# Patient Record
Sex: FEMALE | Race: WHITE | NOT HISPANIC OR LATINO | Employment: OTHER | URBAN - METROPOLITAN AREA
[De-identification: names, ages, dates, MRNs, and addresses within clinical notes are randomized per-mention and may not be internally consistent; named-entity substitution may affect disease eponyms.]

---

## 2017-01-05 ENCOUNTER — TELEPHONE (OUTPATIENT)
Dept: ORTHOPEDICS | Facility: CLINIC | Age: 82
End: 2017-01-05

## 2017-01-05 NOTE — TELEPHONE ENCOUNTER
----- Message from Radha Asa sent at 1/5/2017  8:38 AM CST -----  Contact: pt  _  1st Request  _  2nd Request  _  3rd Request        Who: pt    Why: Np - pt is requesting to see dr interiano today for shoulder pain. Nothing available until 2/14/16. Wants a nurse to call her to see if she can get in today.    What Number to Call Back:[phone]    When to Expect a call back: (Before the end of the day)   -- if call after 3:00 call back will be tomorrow.

## 2017-01-06 ENCOUNTER — OFFICE VISIT (OUTPATIENT)
Dept: ORTHOPEDICS | Facility: CLINIC | Age: 82
End: 2017-01-06
Payer: MEDICARE

## 2017-01-06 VITALS
HEIGHT: 69 IN | SYSTOLIC BLOOD PRESSURE: 109 MMHG | WEIGHT: 156.31 LBS | RESPIRATION RATE: 16 BRPM | BODY MASS INDEX: 23.15 KG/M2 | HEART RATE: 70 BPM | DIASTOLIC BLOOD PRESSURE: 60 MMHG

## 2017-01-06 DIAGNOSIS — M19.011 PRIMARY OSTEOARTHRITIS OF RIGHT SHOULDER: Primary | ICD-10-CM

## 2017-01-06 PROCEDURE — 99213 OFFICE O/P EST LOW 20 MIN: CPT | Mod: 25,S$PBB,, | Performed by: PHYSICIAN ASSISTANT

## 2017-01-06 PROCEDURE — 99999 PR PBB SHADOW E&M-EST. PATIENT-LVL IV: CPT | Mod: PBBFAC,,, | Performed by: PHYSICIAN ASSISTANT

## 2017-01-06 PROCEDURE — 20610 DRAIN/INJ JOINT/BURSA W/O US: CPT | Mod: PBBFAC | Performed by: PHYSICIAN ASSISTANT

## 2017-01-06 PROCEDURE — 20610 DRAIN/INJ JOINT/BURSA W/O US: CPT | Mod: S$PBB,RT,, | Performed by: PHYSICIAN ASSISTANT

## 2017-01-06 PROCEDURE — 99214 OFFICE O/P EST MOD 30 MIN: CPT | Mod: PBBFAC | Performed by: PHYSICIAN ASSISTANT

## 2017-01-06 RX ORDER — TRIAMCINOLONE ACETONIDE 40 MG/ML
40 INJECTION, SUSPENSION INTRA-ARTICULAR; INTRAMUSCULAR
Status: COMPLETED | OUTPATIENT
Start: 2017-01-06 | End: 2017-01-06

## 2017-01-06 RX ADMIN — TRIAMCINOLONE ACETONIDE 40 MG: 40 INJECTION, SUSPENSION INTRA-ARTICULAR; INTRAMUSCULAR at 07:01

## 2017-01-06 NOTE — PROGRESS NOTES
CC: Follow-up (right shoulder)      HPI: Pt with right shoulder pain for the past month. She was seen in the past by stephan Nuñez NP and has known djd of the right shoulder.  The pain was aching and global. It hurts more when she raises her arm over her head or lifts things.  She has taken tylenol without relief.  She is not interested in surgical intervention. She would like to try a cortisone injection since this has helped in the past.     ROS  General: denies fever and chills  Resp: no c/o sob  CVS: no c/o cp  MSK: c/o right shoulder pain which is aching and global     PE  General: AAOx3, pleasant and cooperative  Resp: respirations even and unlabored  MSK: right shoulder exam  AROM  + pain with raising the arm and with reaching  - Neer  + mora  + speeds  + cross body    Xray:  Reviewed by me: Significant degenerative change seen at the AC joint area.  Glenohumeral articulation shows early DJD    Assessment:  Right shoulder djd  Bursitis/ tendotitis    Plan:  Cortisone injection right shoulder today  Tylenol prn  F/u as needed    Shoulder Injection Procedure Note    Pre-operative Diagnosis: right shoulder pain    Post-operative Diagnosis: same    Indications: right shoulder pain    Anesthesia: none    Procedure Details     Verbal consent was obtained for the procedure. The injection site was identified and the skin was prepared with alcohol. The right shoulder was injected from a posterior approach with 1 ml of Kenalog and 5 ml Lidocaine under sterile technique using a 22 gauge 1 1/2 inch needle. The needle was removed and the area cleansed and dressed.    Complications:  None; patient tolerated the procedure well.    she was advised to rest the shoulder today, using ice as needed for comfort and swelling. she did receive immediate relief of the shoulder pain. she was told this would be short lived and is secondary to the lidocaine. she may have an increase in discomfort tonight followed by steady  improvement over the next several days. It may take 1-3 weeks following the injection to get the full benefit of the medication.

## 2017-03-15 ENCOUNTER — TELEPHONE (OUTPATIENT)
Dept: FAMILY MEDICINE | Facility: CLINIC | Age: 82
End: 2017-03-15

## 2017-03-15 NOTE — TELEPHONE ENCOUNTER
----- Message from Carline Herrera sent at 3/15/2017 10:50 AM CDT -----  Contact: self  Pt calling to check status on fax sent over by Orlando dewitt.  Please call pt at .    Thanks

## 2017-03-16 DIAGNOSIS — E11.9 TYPE 2 DIABETES MELLITUS WITHOUT COMPLICATION: ICD-10-CM

## 2017-03-22 ENCOUNTER — HOSPITAL ENCOUNTER (OUTPATIENT)
Dept: RADIOLOGY | Facility: HOSPITAL | Age: 82
Discharge: HOME | End: 2017-03-22
Attending: ORTHOPAEDIC SURGERY
Payer: MEDICARE

## 2017-03-22 ENCOUNTER — OFFICE VISIT (OUTPATIENT)
Dept: ORTHOPEDICS | Facility: CLINIC | Age: 82
End: 2017-03-22
Payer: MEDICARE

## 2017-03-22 VITALS
DIASTOLIC BLOOD PRESSURE: 52 MMHG | RESPIRATION RATE: 16 BRPM | WEIGHT: 156.31 LBS | HEIGHT: 69 IN | HEART RATE: 65 BPM | SYSTOLIC BLOOD PRESSURE: 96 MMHG | BODY MASS INDEX: 23.15 KG/M2

## 2017-03-22 DIAGNOSIS — M25.512 ACUTE PAIN OF BOTH SHOULDERS: ICD-10-CM

## 2017-03-22 DIAGNOSIS — M79.642 LEFT HAND PAIN: ICD-10-CM

## 2017-03-22 DIAGNOSIS — M25.512 ACUTE PAIN OF BOTH SHOULDERS: Primary | ICD-10-CM

## 2017-03-22 DIAGNOSIS — M25.511 ACUTE PAIN OF BOTH SHOULDERS: ICD-10-CM

## 2017-03-22 DIAGNOSIS — M25.511 ACUTE PAIN OF BOTH SHOULDERS: Primary | ICD-10-CM

## 2017-03-22 PROCEDURE — 76604 US EXAM CHEST: CPT | Mod: 26,,, | Performed by: RADIOLOGY

## 2017-03-22 PROCEDURE — 73130 X-RAY EXAM OF HAND: CPT | Mod: 26,LT,, | Performed by: RADIOLOGY

## 2017-03-22 PROCEDURE — 73030 X-RAY EXAM OF SHOULDER: CPT | Mod: 26,50,, | Performed by: RADIOLOGY

## 2017-03-22 PROCEDURE — 76604 US EXAM CHEST: CPT | Mod: TC

## 2017-03-22 PROCEDURE — 73130 X-RAY EXAM OF HAND: CPT | Mod: TC,LT

## 2017-03-22 PROCEDURE — 73030 X-RAY EXAM OF SHOULDER: CPT | Mod: TC,50

## 2017-03-22 PROCEDURE — 99999 PR PBB SHADOW E&M-EST. PATIENT-LVL V: CPT | Mod: PBBFAC,,, | Performed by: PHYSICIAN ASSISTANT

## 2017-03-22 PROCEDURE — 99214 OFFICE O/P EST MOD 30 MIN: CPT | Mod: S$PBB,,, | Performed by: PHYSICIAN ASSISTANT

## 2017-03-22 PROCEDURE — 99215 OFFICE O/P EST HI 40 MIN: CPT | Mod: PBBFAC | Performed by: PHYSICIAN ASSISTANT

## 2017-03-23 ENCOUNTER — TELEPHONE (OUTPATIENT)
Dept: ORTHOPEDICS | Facility: CLINIC | Age: 82
End: 2017-03-23

## 2017-03-23 ENCOUNTER — DOCUMENTATION ONLY (OUTPATIENT)
Dept: ORTHOPEDICS | Facility: CLINIC | Age: 82
End: 2017-03-23

## 2017-03-23 RX ORDER — CLINDAMYCIN HYDROCHLORIDE 300 MG/1
300 CAPSULE ORAL 3 TIMES DAILY
Qty: 30 CAPSULE | Refills: 0 | Status: SHIPPED | OUTPATIENT
Start: 2017-03-23 | End: 2017-03-30

## 2017-03-23 NOTE — PROGRESS NOTES
Rachelle Paredes PA-C inform pt of an appt with orthopedic w/AJ 04/06/17 at 11:15am and General Surgery w/ Dr. Goldberg 04/06/17 at 10:45 am/mailed.

## 2017-03-24 NOTE — PROGRESS NOTES
Called patient to review test results and treatment plan, Left message for her to return my call.

## 2017-03-27 NOTE — PROGRESS NOTES
Subjective:      Patient ID: Lexy Conteh is a 81 y.o. female.    Chief Complaint: Pain of the Right Shoulder; Pain of the Left Shoulder; and Pain of the Left Hand    HPI    Patient is a 81 year old female who presents to clinic with chief complaint of left thumb pain and knot on left back.   Patient stated that she has had a-traumatic constant left base thumb pain and mild swelling x 2 days. Symptoms are increased with range of motion. Patient denied fever or chills, reports history of gout and arthritis. Patient is currently taking Celebrex. \  Patient also stated that she noticed a knot on her left upper back between and scapula and her spine. Patient has history of breast cancer. She cannot do an MRI due to pace maker, she cannot do a CT with contrast secondary to kidney.     Review of Systems   Constitution: Negative for chills and fever.   Cardiovascular: Negative for chest pain.   Respiratory: Negative for cough and shortness of breath.    Skin: Negative for color change, dry skin, itching, nail changes, poor wound healing and rash.   Musculoskeletal: Positive for gout, joint pain and joint swelling.        Left thumb and left back mass.    Neurological: Negative for dizziness.   Psychiatric/Behavioral: Negative for altered mental status. The patient is not nervous/anxious.    All other systems reviewed and are negative.        Objective:        General    Constitutional: She is oriented to person, place, and time. She appears well-developed and well-nourished. No distress.   HENT:   Head: Atraumatic.   Eyes: Conjunctivae are normal.   Cardiovascular: Normal rate.    Pulmonary/Chest: Effort normal.   Neurological: She is alert and oriented to person, place, and time.   Psychiatric: She has a normal mood and affect. Her behavior is normal.         Left Hand/Wrist Exam     Inspection   Scars: Wrist - absent Hand -  absent  Effusion: Wrist - absent Hand -  absent  Bruising: Wrist - absent Hand -   absent    Pain   Hand - The patient exhibits pain of the thumb MCP.    Range of Motion     Wrist   Extension: normal   Flexion: normal   Pronation: normal   Supination: normal     Comments:  Patient has swelling of CMC with erythema and increased warmth, TTP, not to light touch.           Left Shoulder Exam     Comments:  Patient has smooth well circumcised tender movable mass to mid left upper back between scapula and spine.       Muscle Strength   Left Upper Extremity  Wrist Extension: 5/5/5   Wrist Flexion: 5/5/5   :  4/5/5   Index Finger: 5/5  Middle Finger: 5/5  Ring Finger: 5/5  Little Finger: 5/5  Thumb - APB: 4/5  Thumb - FPL: 4/5  Pinch Mechanism: 4/5      RADS:   HAND:Significant DJD involving the PIP and DIP joints.  Significant DJD are also noted involving the first carpometacarpal articulation.  Narrowed second and third metacarpophalangeal articulation No fracture or bone destruction.  Calcification noted in the area of the triangular fibrocartilage.    SHOULDER: Moderate bilateral a.c. joint degenerative changes, right greater than left., mass not visible.         Assessment:       Encounter Diagnoses   Name Primary?    Acute pain of both shoulders Yes    Left hand pain     Mass           Plan:       Discussed treatment plan of patient.   HAND: Labs placed for CMP, SED and uric acid, treatment is pending results. R/O infection vrs gout vrs arthritis. Pending results will treated with clindamycin vrs steroid.    Patient placed into brace to help rest area. She will also elevate and ice    MASS: Order placed for ultrasound, pending results patient is to get appointment with general surgery for evaluation.

## 2017-03-30 ENCOUNTER — OFFICE VISIT (OUTPATIENT)
Dept: SURGERY | Facility: CLINIC | Age: 82
End: 2017-03-30
Payer: MEDICARE

## 2017-03-30 ENCOUNTER — OFFICE VISIT (OUTPATIENT)
Dept: ORTHOPEDICS | Facility: CLINIC | Age: 82
End: 2017-03-30
Payer: MEDICARE

## 2017-03-30 VITALS
WEIGHT: 156 LBS | TEMPERATURE: 98 F | HEART RATE: 73 BPM | SYSTOLIC BLOOD PRESSURE: 129 MMHG | BODY MASS INDEX: 23.11 KG/M2 | HEIGHT: 69 IN | DIASTOLIC BLOOD PRESSURE: 60 MMHG

## 2017-03-30 DIAGNOSIS — R22.2 MASS ON BACK: Primary | ICD-10-CM

## 2017-03-30 DIAGNOSIS — M19.011 PRIMARY OSTEOARTHRITIS OF RIGHT SHOULDER: ICD-10-CM

## 2017-03-30 DIAGNOSIS — Z09 FOLLOW-UP EXAM: Primary | ICD-10-CM

## 2017-03-30 DIAGNOSIS — E11.9 TYPE 2 DIABETES MELLITUS WITHOUT COMPLICATION: ICD-10-CM

## 2017-03-30 PROCEDURE — 99999 PR PBB SHADOW E&M-EST. PATIENT-LVL III: CPT | Mod: PBBFAC,,, | Performed by: PHYSICIAN ASSISTANT

## 2017-03-30 PROCEDURE — 99499 UNLISTED E&M SERVICE: CPT | Mod: 25,S$PBB,, | Performed by: PHYSICIAN ASSISTANT

## 2017-03-30 PROCEDURE — 99213 OFFICE O/P EST LOW 20 MIN: CPT | Mod: PBBFAC,27 | Performed by: SURGERY

## 2017-03-30 PROCEDURE — 99999 PR PBB SHADOW E&M-EST. PATIENT-LVL III: CPT | Mod: PBBFAC,,, | Performed by: SURGERY

## 2017-03-30 PROCEDURE — 20610 DRAIN/INJ JOINT/BURSA W/O US: CPT | Mod: S$PBB,RT,, | Performed by: PHYSICIAN ASSISTANT

## 2017-03-30 PROCEDURE — 99203 OFFICE O/P NEW LOW 30 MIN: CPT | Mod: S$PBB,,, | Performed by: SURGERY

## 2017-03-30 RX ADMIN — TRIAMCINOLONE ACETONIDE 40 MG: 40 INJECTION, SUSPENSION INTRA-ARTICULAR; INTRAMUSCULAR at 06:03

## 2017-03-30 NOTE — PROGRESS NOTES
Spoke with patient. Patient was unable to get antibiotics, though thumb symptoms resolved without any treatment.   Patient stated that she has appointment today with general surgery for evaluation of mass on her back.   She also reports that she has had continued pain in her right shoulder. Patient stated  That she is not interested in pain management for this. She cannot tae NSIAD due to kidney function. She has had steroid injection in the past with provides minimal relief. She is open to surgical intervention but would like to have mass evaluated before considering. Patient would like to have injection today, though I informed her to first go to general surgery and if cleared to have injection by them I can give today after her appointment.     - Patient returned to clinic after appointment to receive injection into her right shoulder.

## 2017-03-30 NOTE — PROGRESS NOTES
History & Physical    SUBJECTIVE:     History of Present Illness:  Patient is a 81 y.o. female presents as referral from Orthopedics clinic for evaluation of small tender mass on left upper back. Patient states she first noticed it when it pressed against the back of a chair causing some pain. She denies changes in the symptoms or size of the lesion since first noticed it. It only causes pain with direct contact. Had U/S done showing small (1.5x1.5x.6cm cyst versus neuroma) mass. On Pradaxa for atrial fib.    Chief Complaint   Patient presents with    Consult    Mass     left upper back       Review of patient's allergies indicates:   Allergen Reactions    Antihistimine Other (See Comments)    Antivert  [meclizine] Rash       Current Outpatient Prescriptions   Medication Sig Dispense Refill    allopurinol (ZYLOPRIM) 300 MG tablet Take 1 tablet (300 mg total) by mouth once daily. 1 Tablet Oral Every day 90 tablet 12    anastrozole (ARIMIDEX) 1 mg Tab Take 1 tablet (1 mg total) by mouth once daily. 90 tablet 12    dabigatran etexilate (PRADAXA) 150 mg Cap Take 1 capsule (150 mg total) by mouth 2 (two) times daily. 180 capsule 12    desonide 0.05% (DESOWEN) 0.05 % Oint AAA face bid 60 g 3    digoxin (LANOXIN) 250 mcg tablet Take 1 tablet (250 mcg total) by mouth once daily. 1 Tablet Oral Every day 100 tablet 12    fluticasone-salmeterol 250-50 mcg/dose (ADVAIR DISKUS) 250-50 mcg/dose diskus inhaler Inhale 1 puff into the lungs 2 (two) times daily. 1 Disk with Device Inhalation Twice a day 180 each 12    gabapentin (NEURONTIN) 100 MG capsule 2  capsule 12    latanoprost 0.005 % ophthalmic solution   6    lidocaine (LIDODERM) 5 % Place 1 patch onto the skin once daily. Remove & Discard patch within 12 hours or as directed by MD 30 patch 12    losartan (COZAAR) 50 MG tablet Take 1 tablet (50 mg total) by mouth once daily. 90 tablet 90    metformin (GLUCOPHAGE-XR) 500 MG 24 hr tablet Take 1 tablet (500  mg total) by mouth once daily. 180 tablet 3    multivitamin capsule Take 1 capsule by mouth once daily.      sertraline (ZOLOFT) 50 MG tablet Take 1 tablet (50 mg total) by mouth once daily. 1 Tablet Oral Every day 90 tablet 12    simvastatin (ZOCOR) 20 MG tablet Take 1 tablet (20 mg total) by mouth every evening. 90 tablet 12    theophylline (THEODUR) 300 MG 12 hr tablet Take 1 tablet (300 mg total) by mouth every 12 (twelve) hours. 1 Tablet Sustained Release 12HR Oral Twice a day 180 tablet 12    torsemide (DEMADEX) 20 MG Tab Take 1 tablet (20 mg total) by mouth once daily. Take 1 tablet every morning/everyday. 90 tablet 12    tramadol (ULTRAM) 50 mg tablet 1-2 q 6hr prn 180 tablet 5    triamcinolone acetonide 0.1% (KENALOG) 0.1 % ointment AAA hand bid - may occlude qhs 60 g 3    urea (CARMOL) 40 % Crea Apply topically 2 (two) times daily. 120 g 5    VIT C/VIT E/LUTEIN/MIN/OMEGA-3 (OCUVITE ORAL) Take by mouth.       No current facility-administered medications for this visit.        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    Atrial fibrillation 9/7/2012    Blood clotting tendency     Breast cancer     Chronic LBP 4/28/2015    CKD (chronic kidney disease) 6/30/2015    Diabetes mellitus type II, uncontrolled 12/11/2012    Diabetes mellitus with renal manifestations, controlled     Family history of colon cancer 10/7/2015    Fever blister     HTN (hypertension) 9/7/2012    Hyperlipemia     Hyperlipidemia 12/11/2012    Intrinsic asthma, unspecified 12/11/2012    Keloid cicatrix     Pacemaker     SQUAMOUS CELL CARCINOMA 7/2013    right dorsal hand     Past Surgical History:   Procedure Laterality Date    CERVICAL DISC SURGERY  1990's    C5 & C6    KNEE SURGERY  1-    bilateral TKR    LUMBAR LAMINECTOMY      makayla 2014    MASTECTOMY Right     2 yrs later  "had left side done     Family History   Problem Relation Age of Onset    Melanoma Neg Hx     Eczema Neg Hx     Psoriasis Neg Hx      Social History   Substance Use Topics    Smoking status: Never Smoker    Smokeless tobacco: Never Used    Alcohol use No        Review of Systems:  Review of Systems   Constitutional: Negative for activity change, appetite change, fatigue and fever.   HENT: Negative for ear pain, facial swelling, hearing loss, sore throat and trouble swallowing.    Eyes: Negative for pain and redness.   Respiratory: Negative for cough, chest tightness and shortness of breath.    Cardiovascular: Negative for chest pain, palpitations and leg swelling.   Gastrointestinal: Negative for abdominal pain, constipation, diarrhea, nausea and vomiting.   Endocrine: Negative for cold intolerance, heat intolerance, polydipsia and polyuria.   Genitourinary: Negative for dysuria, flank pain, frequency and urgency.   Musculoskeletal: Positive for arthralgias and back pain. Negative for neck pain and neck stiffness.   Skin: Negative for color change, pallor and rash.   Neurological: Negative for dizziness, seizures, numbness and headaches.   Hematological: Negative for adenopathy. Bruises/bleeds easily.   Psychiatric/Behavioral: Negative for agitation and confusion. The patient is not nervous/anxious.        OBJECTIVE:     Vital Signs (Most Recent)  Temp: 98.1 °F (36.7 °C) (03/30/17 0855)  Pulse: 73 (03/30/17 0855)  BP: 129/60 (03/30/17 0855)  5' 9" (1.753 m)  70.8 kg (156 lb)     Physical Exam:  Physical Exam   Constitutional: She is oriented to person, place, and time. She appears well-developed and well-nourished. No distress.   HENT:   Head: Normocephalic and atraumatic.   Mouth/Throat: Oropharynx is clear and moist.   Eyes: Conjunctivae and EOM are normal. No scleral icterus.   Neck: Normal range of motion. Neck supple.   Cardiovascular: Normal heart sounds.    Pulmonary/Chest: Effort normal and breath " sounds normal. No respiratory distress.   Abdominal: Soft. Bowel sounds are normal. She exhibits no distension. There is no tenderness.   Musculoskeletal: Normal range of motion.   Unable to palpate mass at area of tenderness in right paraspinal area next to scapula. No erythema or skin changes   Lymphadenopathy:     She has no cervical adenopathy.   Neurological: She is alert and oriented to person, place, and time.   Skin: Skin is warm and dry. No erythema.   Psychiatric: She has a normal mood and affect. Her behavior is normal. Judgment and thought content normal.   Nursing note and vitals reviewed.      Laboratory      Diagnostic Results:  US: Reviewed    ASSESSMENT/PLAN:     Mildly symptomatic, very small soft tissue mass that is very difficult to palpate.    PLAN:Plan     Watchful waiting versus excisional biopsy discussed. She prefers the former.  Ok for injection in shoulder

## 2017-03-30 NOTE — LETTER
March 30, 2017      Rachelle Paredes PA-C  [address] [facility] [address]           [facility] - General Surgery  [address]  [place] [address]  Phone: [phone]          Patient: Lexy Conteh   MR Number: [id number]   YOB: 1935   Date of Visit: 3/30/2017       Dear Rachelle Paredes:    Thank you for referring Lexy Conteh to me for evaluation. Attached you will find relevant portions of my assessment and plan of care.    If you have questions, please do not hesitate to call me. I look forward to following Lexy Conteh along with you.    Sincerely,    Joshua Goldberg, MD    Enclosure  CC:  No Recipients    If you would like to receive this communication electronically, please contact [email or web address] or [phone] to request more information on [employer] access.    For providers and/or their staff who would like to refer a patient to [facility], please contact us through our one-stop-shop provider referral line, [facility], at [phone].    If you feel you have received this communication in error or would no longer like to receive these types of communications, please e-mail [email or web address]

## 2017-03-31 RX ORDER — TRIAMCINOLONE ACETONIDE 40 MG/ML
40 INJECTION, SUSPENSION INTRA-ARTICULAR; INTRAMUSCULAR
Status: COMPLETED | OUTPATIENT
Start: 2017-03-31 | End: 2017-03-30

## 2017-03-31 NOTE — PROGRESS NOTES
Lexy Conteh is a 81 y.o. year old her here today for injection for degenerative changes of herright shoulder . she was last seen and treated in the clinic on 3/22/2017. There has been no significant change in her medical status since her last visit. No Fever, chills, malaise, or unexplained weight change.      Allergies, Medications, past medical and surgical history were reviewed .    Examination of the shoulder demonstrates  No evidence of edema, erythema , echymosis strength and range of motion are unchanged from previous visit.    PROCEDURE:  I have explained the risks, benefits, and alternatives of the procedure in detail.  The patient voices understanding and all questions have been answered.  The patient agrees to proceed as planned. So after I performed a sterile prep of the skin in the normal fashion the right shoulder is injected from the posterior approach using a 22 gauge needle with a combination of 4cc 1% plain lidocaine and 40 mg of kenalog. The patient is cautioned and immediate relief of pain is secondary to the local anesthetic and will be temporary.  After the anesthetic wears off there may be a increase in pain that may last for a few hours or a few days and they should use ice to help alleviate this flair up of pain.

## 2017-05-03 ENCOUNTER — TELEPHONE (OUTPATIENT)
Dept: FAMILY MEDICINE | Facility: CLINIC | Age: 82
End: 2017-05-03

## 2017-05-03 NOTE — TELEPHONE ENCOUNTER
----- Message from Charles Foster sent at 5/3/2017 10:30 AM CDT -----  Contact: Self  Pt called to schedule clearance apt. Pt can be reached @ [phone].

## 2017-05-10 ENCOUNTER — TELEPHONE (OUTPATIENT)
Dept: FAMILY MEDICINE | Facility: CLINIC | Age: 82
End: 2017-05-10

## 2017-05-10 DIAGNOSIS — E78.5 HYPERLIPIDEMIA, UNSPECIFIED HYPERLIPIDEMIA TYPE: ICD-10-CM

## 2017-05-10 DIAGNOSIS — N18.3 CKD (CHRONIC KIDNEY DISEASE), STAGE 3 (MODERATE): Primary | ICD-10-CM

## 2017-05-10 DIAGNOSIS — N18.30 CONTROLLED TYPE 2 DIABETES MELLITUS WITH STAGE 3 CHRONIC KIDNEY DISEASE, WITHOUT LONG-TERM CURRENT USE OF INSULIN: ICD-10-CM

## 2017-05-10 DIAGNOSIS — E11.22 CONTROLLED TYPE 2 DIABETES MELLITUS WITH STAGE 3 CHRONIC KIDNEY DISEASE, WITHOUT LONG-TERM CURRENT USE OF INSULIN: ICD-10-CM

## 2017-05-10 NOTE — TELEPHONE ENCOUNTER
----- Message from Albertina Matthews sent at 5/10/2017  8:39 AM CDT -----  Contact: Self/[phone]  Patient would like to know if she needs blood work before her appointment. Thank you.

## 2017-05-19 ENCOUNTER — OFFICE VISIT (OUTPATIENT)
Dept: FAMILY MEDICINE | Facility: CLINIC | Age: 82
End: 2017-05-19
Payer: MEDICARE

## 2017-05-19 ENCOUNTER — LAB VISIT (OUTPATIENT)
Dept: LAB | Facility: HOSPITAL | Age: 82
End: 2017-05-19
Attending: INTERNAL MEDICINE
Payer: MEDICARE

## 2017-05-19 VITALS
BODY MASS INDEX: 22.23 KG/M2 | TEMPERATURE: 99 F | HEART RATE: 71 BPM | DIASTOLIC BLOOD PRESSURE: 50 MMHG | WEIGHT: 158.75 LBS | SYSTOLIC BLOOD PRESSURE: 120 MMHG | OXYGEN SATURATION: 98 % | HEIGHT: 71 IN

## 2017-05-19 DIAGNOSIS — N18.30 CONTROLLED TYPE 2 DIABETES MELLITUS WITH STAGE 3 CHRONIC KIDNEY DISEASE, WITHOUT LONG-TERM CURRENT USE OF INSULIN: ICD-10-CM

## 2017-05-19 DIAGNOSIS — E11.22 CONTROLLED TYPE 2 DIABETES MELLITUS WITH STAGE 3 CHRONIC KIDNEY DISEASE, WITHOUT LONG-TERM CURRENT USE OF INSULIN: ICD-10-CM

## 2017-05-19 DIAGNOSIS — N18.3 CKD (CHRONIC KIDNEY DISEASE), STAGE 3 (MODERATE): ICD-10-CM

## 2017-05-19 DIAGNOSIS — J32.9 BACTERIAL SINUSITIS: Primary | ICD-10-CM

## 2017-05-19 DIAGNOSIS — J45.901 ASTHMA WITH ACUTE EXACERBATION, UNSPECIFIED ASTHMA SEVERITY: ICD-10-CM

## 2017-05-19 DIAGNOSIS — B96.89 BACTERIAL SINUSITIS: Primary | ICD-10-CM

## 2017-05-19 DIAGNOSIS — E78.5 HYPERLIPIDEMIA, UNSPECIFIED HYPERLIPIDEMIA TYPE: ICD-10-CM

## 2017-05-19 LAB
ALBUMIN SERPL BCP-MCNC: 3.4 G/DL
ALP SERPL-CCNC: 57 U/L
ALT SERPL W/O P-5'-P-CCNC: 13 U/L
ANION GAP SERPL CALC-SCNC: 9 MMOL/L
AST SERPL-CCNC: 19 U/L
BASOPHILS # BLD AUTO: 0.02 K/UL
BASOPHILS NFR BLD: 0.2 %
BILIRUB SERPL-MCNC: 0.5 MG/DL
BUN SERPL-MCNC: 32 MG/DL
CALCIUM SERPL-MCNC: 9.9 MG/DL
CHLORIDE SERPL-SCNC: 103 MMOL/L
CHOLEST/HDLC SERPL: 2 {RATIO}
CO2 SERPL-SCNC: 30 MMOL/L
CREAT SERPL-MCNC: 1.4 MG/DL
CRP SERPL-MCNC: 37.5 MG/L
DIFFERENTIAL METHOD: ABNORMAL
EOSINOPHIL # BLD AUTO: 0.1 K/UL
EOSINOPHIL NFR BLD: 1 %
ERYTHROCYTE [DISTWIDTH] IN BLOOD BY AUTOMATED COUNT: 17.4 %
ERYTHROCYTE [SEDIMENTATION RATE] IN BLOOD BY WESTERGREN METHOD: 58 MM/HR
EST. GFR  (AFRICAN AMERICAN): 40.6 ML/MIN/1.73 M^2
EST. GFR  (NON AFRICAN AMERICAN): 35.3 ML/MIN/1.73 M^2
FERRITIN SERPL-MCNC: 74 NG/ML
GLUCOSE SERPL-MCNC: 116 MG/DL
HCT VFR BLD AUTO: 31.9 %
HDL/CHOLESTEROL RATIO: 50 %
HDLC SERPL-MCNC: 120 MG/DL
HDLC SERPL-MCNC: 60 MG/DL
HGB BLD-MCNC: 10 G/DL
IRON SERPL-MCNC: 19 UG/DL
LDLC SERPL CALC-MCNC: 50.6 MG/DL
LYMPHOCYTES # BLD AUTO: 1 K/UL
LYMPHOCYTES NFR BLD: 12.6 %
MCH RBC QN AUTO: 27.9 PG
MCHC RBC AUTO-ENTMCNC: 31.3 %
MCV RBC AUTO: 89 FL
MONOCYTES # BLD AUTO: 1 K/UL
MONOCYTES NFR BLD: 12.3 %
NEUTROPHILS # BLD AUTO: 6 K/UL
NEUTROPHILS NFR BLD: 73.5 %
NONHDLC SERPL-MCNC: 60 MG/DL
PLATELET # BLD AUTO: 212 K/UL
PMV BLD AUTO: 9.8 FL
POTASSIUM SERPL-SCNC: 4.6 MMOL/L
PROT SERPL-MCNC: 7.1 G/DL
RBC # BLD AUTO: 3.58 M/UL
SATURATED IRON: 6 %
SODIUM SERPL-SCNC: 142 MMOL/L
TOTAL IRON BINDING CAPACITY: 305 UG/DL
TRANSFERRIN SERPL-MCNC: 206 MG/DL
TRIGL SERPL-MCNC: 47 MG/DL
TSH SERPL DL<=0.005 MIU/L-ACNC: 1.32 UIU/ML
WBC # BLD AUTO: 8.11 K/UL

## 2017-05-19 PROCEDURE — 99214 OFFICE O/P EST MOD 30 MIN: CPT | Mod: S$PBB,,, | Performed by: NURSE PRACTITIONER

## 2017-05-19 PROCEDURE — 99999 PR PBB SHADOW E&M-EST. PATIENT-LVL IV: CPT | Mod: PBBFAC,,, | Performed by: NURSE PRACTITIONER

## 2017-05-19 PROCEDURE — 99214 OFFICE O/P EST MOD 30 MIN: CPT | Mod: PBBFAC,PO | Performed by: NURSE PRACTITIONER

## 2017-05-19 RX ORDER — AMOXICILLIN AND CLAVULANATE POTASSIUM 875; 125 MG/1; MG/1
1 TABLET, FILM COATED ORAL EVERY 12 HOURS
Qty: 20 TABLET | Refills: 0 | Status: SHIPPED | OUTPATIENT
Start: 2017-05-19 | End: 2017-05-29

## 2017-05-19 RX ORDER — LEVALBUTEROL TARTRATE 45 UG/1
1-2 AEROSOL, METERED ORAL EVERY 4 HOURS PRN
Qty: 15 G | Refills: 1 | Status: SHIPPED | OUTPATIENT
Start: 2017-05-19 | End: 2017-07-12

## 2017-05-19 RX ORDER — FLUTICASONE PROPIONATE 50 MCG
2 SPRAY, SUSPENSION (ML) NASAL DAILY
Qty: 16 G | Refills: 1 | Status: SHIPPED | OUTPATIENT
Start: 2017-05-19 | End: 2017-06-18

## 2017-05-19 NOTE — MR AVS SNAPSHOT
[facility]  [address]  [place] [address]  Phone: [phone]  Fax: [phone]                  Lexy Conteh   2017 11:00 AM   Office Visit    Description:  Female : 1935   Provider:  Clarence Parisi NP   Department:  [facility]           Reason for Visit     Cough           Diagnoses this Visit        Comments    Bacterial sinusitis    -  Primary left maxillary    Asthma with acute exacerbation, unspecified asthma severity                To Do List           Future Appointments        Provider Department Dept Phone    2017 11:00 AM Clarence Parisi NP [facility] [phone]    2017 10:40 AM Ronny Mathis MD [facility] [phone]    2017 10:45 AM LAB, [place] [facility] [phone]    2017 11:00 AM SPECIMEN, M[place] [facility] [phone]    2017 9:00 AM Keven Middleton MD [facility] - Nephrology [phone]      Goals (5 Years of Data)     None      Follow-Up and Disposition     Return if symptoms worsen or fail to improve.       These Medications        Disp Refills Start End    levalbuterol (XOPENEX HFA) 45 mcg/actuation inhaler 15 g 1 2017    Inhale 1-2 puffs into the lungs every 4 (four) hours as needed for Wheezing. Rescue - Inhalation    Pharmacy: [facility] [address] [place] [address] [place] AT [facility] Ph #: [phone]       amoxicillin-clavulanate 875-125mg (AUGMENTIN) 875-125 mg per tablet 20 tablet 0 2017    Take 1 tablet by mouth every 12 (twelve) hours. - Oral    Pharmacy: [facility] [address] [place] [address] [place] AT [facility] Ph #: [phone]       fluticasone (FLONASE) 50 mcg/actuation nasal spray 16 g 1 2017    2 sprays by Each Nare route once daily. - Each Nare    Pharmacy: [facility] [id number]  [address] [place] AT [facility] [place] Ph #: [phone]         [facility]     [facility] Care [facility] -  Assistance  Unless otherwise directed by your provider, please contact [facility], our nurse care line that is available for  assistance.     Registered nurses in the [facility] provide: appointment scheduling, clinical advisement, health education, and other advisory services.  Call: [phone] (toll free)               Medications           Message regarding Medications     Verify the changes and/or additions to your medication regime listed below are the same as discussed with your clinician today.  If any of these changes or additions are incorrect, please notify your healthcare provider.        START taking these NEW medications        Refills    levalbuterol (XOPENEX HFA) 45 mcg/actuation inhaler 1    Sig: Inhale 1-2 puffs into the lungs every 4 (four) hours as needed for Wheezing. Rescue    Class: Normal    Route: Inhalation    amoxicillin-clavulanate 875-125mg (AUGMENTIN) 875-125 mg per tablet 0    Sig: Take 1 tablet by mouth every 12 (twelve) hours.    Class: Normal    Route: Oral    fluticasone (FLONASE) 50 mcg/actuation nasal spray 1    Si sprays by Each Nare route once daily.    Class: Normal    Route: Each Nare           Verify that the below list of medications is an accurate representation of the medications you are currently taking.  If none reported, the list may be blank. If incorrect, please contact your healthcare provider. Carry this list with you in case of emergency.           Current Medications     allopurinol (ZYLOPRIM) 300 MG tablet Take 1 tablet (300 mg total) by mouth once daily. 1 Tablet Oral Every day    amoxicillin-clavulanate 875-125mg (AUGMENTIN) 875-125 mg per tablet Take 1 tablet by mouth every 12 (twelve) hours.    anastrozole (ARIMIDEX) 1 mg Tab Take 1 tablet (1 mg total) by mouth once daily.     dabigatran etexilate (PRADAXA) 150 mg Cap Take 1 capsule (150 mg total) by mouth 2 (two) times daily.    desonide 0.05% (DESOWEN) 0.05 % Oint AAA face bid    digoxin (LANOXIN) 250 mcg tablet Take 1 tablet (250 mcg total) by mouth once daily. 1 Tablet Oral Every day    fluticasone (FLONASE) 50 mcg/actuation nasal spray 2 sprays by Each Nare route once daily.    fluticasone-salmeterol 250-50 mcg/dose (ADVAIR DISKUS) 250-50 mcg/dose diskus inhaler Inhale 1 puff into the lungs 2 (two) times daily. 1 Disk with Device Inhalation Twice a day    gabapentin (NEURONTIN) 100 MG capsule 2 HS    latanoprost 0.005 % ophthalmic solution     levalbuterol (XOPENEX HFA) 45 mcg/actuation inhaler Inhale 1-2 puffs into the lungs every 4 (four) hours as needed for Wheezing. Rescue    lidocaine (LIDODERM) 5 % Place 1 patch onto the skin once daily. Remove & Discard patch within 12 hours or as directed by MD    losartan (COZAAR) 50 MG tablet Take 1 tablet (50 mg total) by mouth once daily.    metformin (GLUCOPHAGE-XR) 500 MG 24 hr tablet Take 1 tablet (500 mg total) by mouth once daily.    multivitamin capsule Take 1 capsule by mouth once daily.    sertraline (ZOLOFT) 50 MG tablet Take 1 tablet (50 mg total) by mouth once daily. 1 Tablet Oral Every day    simvastatin (ZOCOR) 20 MG tablet Take 1 tablet (20 mg total) by mouth every evening.    theophylline (THEODUR) 300 MG 12 hr tablet Take 1 tablet (300 mg total) by mouth every 12 (twelve) hours. 1 Tablet Sustained Release 12HR Oral Twice a day    torsemide (DEMADEX) 20 MG Tab Take 1 tablet (20 mg total) by mouth once daily. Take 1 tablet every morning/everyday.    tramadol (ULTRAM) 50 mg tablet 1-2 q 6hr prn    triamcinolone acetonide 0.1% (KENALOG) 0.1 % ointment AAA hand bid - may occlude qhs    urea (CARMOL) 40 % Crea Apply topically 2 (two) times daily.    VIT C/VIT E/LUTEIN/MIN/OMEGA-3 (OCUVITE ORAL) Take by mouth.           Clinical Reference Information           Your Vitals Were   "   BP Pulse Temp Height Weight SpO2    120/50 (BP Location: Left arm, Patient Position: Sitting, BP Method: Manual) 71 99.1 °F (37.3 °C) (Oral) 5' 11" (1.803 m) 72 kg (158 lb 11.7 oz) 98%    BMI                22.14 kg/m2          Blood Pressure          Most Recent Value    BP  (!)  120/50      Allergies as of 5/19/2017     Antihistimine    Antivert  [Meclizine]      Immunizations Administered on Date of Encounter - 5/19/2017     None      [facility] Sign-Up     Activating your [facility] account is as easy as 1-2-3!     1) Visit [email or web address], select Sign Up Now, enter this activation code and your date of birth, then select Next.  Activation code not generated  Current Patient Portal Status: Account disabled      2) Create a username and password to use when you visit [facility] in the future and select a security question in case you lose your password and select Next.    3) Enter your e-mail address and click Sign Up!    Additional Information  If you have questions, please e-mail [email or web address] or call [phone] to talk to our [facility] staff. Remember, [facility] is NOT to be used for urgent needs. For medical emergencies, dial 911.         Instructions    Saline nasal spray 2 or 3 times a day for nasal congestion.        Language Assistance Services     ATTENTION: Language assistance services are available, free of charge. Please call [phone].      ATENCIÓN: Si habla español, tiene a garza disposición servicios gratuitos de asistencia lingüística. Llame al [phone].     CHÚ Ý: N?u b?n nói Ti?ng Vi?t, có các d?ch v? h? tr? ngôn ng? mi?n phí dành cho b?n. G?i s? [phone].         [facility] complies with applicable Federal civil rights laws and does not discriminate on the basis of race, color, national origin, age, disability, or sex.        "

## 2017-05-19 NOTE — PROGRESS NOTES
This dictation has been generated using Dragon Dictation some phonetic errors may occur.     Lexy was seen today for cough.    Diagnoses and all orders for this visit:    Bacterial sinusitis  Comments:  left maxillary    Asthma with acute exacerbation, unspecified asthma severity    Other orders  -     levalbuterol (XOPENEX HFA) 45 mcg/actuation inhaler; Inhale 1-2 puffs into the lungs every 4 (four) hours as needed for Wheezing. Rescue  -     amoxicillin-clavulanate 875-125mg (AUGMENTIN) 875-125 mg per tablet; Take 1 tablet by mouth every 12 (twelve) hours.  -     fluticasone (FLONASE) 50 mcg/actuation nasal spray; 2 sprays by Each Nare route once daily.      Bacterial sinusitis treat with meds as above.  Exacerbation of asthma add Xopenex because of history of atrial fibrillation avoiding albuterol.  Flonase as above.  If symptoms not improving consider steroids.  After careful review of history of present illness and physical examination I have determined that No imaging or steroids necessary at this time.     Return if symptoms worsen or fail to improve.    Patient Instructions   Saline nasal spray 2 or 3 times a day for nasal congestion.   ________________________________________________________________  ________________________________________________________________        Chief Complaint   Patient presents with    Cough     History of present illness  This 81 y.o. presents today for complaint of cold issues.  Symptoms started Sunday.  She notes cough and mucus.  She's had back pain with coughing.  Patient notes sinus pressure and pain especially in the left maxillary region.  She indicates that her nasal congestion has been green.  Sunday she had some postnasal drip and hoarseness.  She tried Coricidin in the morning without improvement.  Review of systems  Unsure about fever or chills.  Patient does note sweats.  Sinus congestion especially on the left.  Unilateral green nasal mucus on the left.  Chest  pain with deep inspiration and cough.  No shortness of breath.  Patient denies wheezing.  No dyspnea on exertion.  No nausea vomiting or diarrhea.  Appetite has been good.  Denies rash    Past medical and social history reviewed.  Patient is new to me.  Follows with one of my partners.    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    Atrial fibrillation 9/7/2012    Blood clotting tendency     Breast cancer     Chronic LBP 4/28/2015    CKD (chronic kidney disease) 6/30/2015    Diabetes mellitus type II, uncontrolled 12/11/2012    Diabetes mellitus with renal manifestations, controlled     Family history of colon cancer 10/7/2015    Fever blister     HTN (hypertension) 9/7/2012    Hyperlipemia     Hyperlipidemia 12/11/2012    Intrinsic asthma, unspecified 12/11/2012    Keloid cicatrix     Pacemaker     SQUAMOUS CELL CARCINOMA 7/2013    right dorsal hand       Past Surgical History:   Procedure Laterality Date    CERVICAL DISC SURGERY  1990's    C5 & C6    KNEE SURGERY  1-    bilateral TKR    LUMBAR LAMINECTOMY      [place] 2014    MASTECTOMY Right     2 yrs later had left side done       Family History   Problem Relation Age of Onset    Melanoma Neg Hx     Eczema Neg Hx     Psoriasis Neg Hx        Social History     Social History    Marital status:      Spouse name: N/A    Number of children: N/A    Years of education: N/A     Occupational History    Retired      Social History Main Topics    Smoking status: Never Smoker    Smokeless tobacco: Never Used    Alcohol use No    Drug use: No    Sexual activity: Not Asked     Other Topics Concern    Are You Pregnant Or Think You May Be? No    Breast-Feeding No     Social History Narrative       Current Outpatient Prescriptions   Medication Sig Dispense Refill    allopurinol (ZYLOPRIM) 300 MG tablet  Take 1 tablet (300 mg total) by mouth once daily. 1 Tablet Oral Every day 90 tablet 12    anastrozole (ARIMIDEX) 1 mg Tab Take 1 tablet (1 mg total) by mouth once daily. 90 tablet 12    dabigatran etexilate (PRADAXA) 150 mg Cap Take 1 capsule (150 mg total) by mouth 2 (two) times daily. 180 capsule 12    desonide 0.05% (DESOWEN) 0.05 % Oint AAA face bid 60 g 3    digoxin (LANOXIN) 250 mcg tablet Take 1 tablet (250 mcg total) by mouth once daily. 1 Tablet Oral Every day 100 tablet 12    fluticasone-salmeterol 250-50 mcg/dose (ADVAIR DISKUS) 250-50 mcg/dose diskus inhaler Inhale 1 puff into the lungs 2 (two) times daily. 1 Disk with Device Inhalation Twice a day 180 each 12    gabapentin (NEURONTIN) 100 MG capsule 2  capsule 12    latanoprost 0.005 % ophthalmic solution   6    lidocaine (LIDODERM) 5 % Place 1 patch onto the skin once daily. Remove & Discard patch within 12 hours or as directed by MD 30 patch 12    losartan (COZAAR) 50 MG tablet Take 1 tablet (50 mg total) by mouth once daily. 90 tablet 90    metformin (GLUCOPHAGE-XR) 500 MG 24 hr tablet Take 1 tablet (500 mg total) by mouth once daily. 180 tablet 3    multivitamin capsule Take 1 capsule by mouth once daily.      sertraline (ZOLOFT) 50 MG tablet Take 1 tablet (50 mg total) by mouth once daily. 1 Tablet Oral Every day 90 tablet 12    simvastatin (ZOCOR) 20 MG tablet Take 1 tablet (20 mg total) by mouth every evening. 90 tablet 12    theophylline (THEODUR) 300 MG 12 hr tablet Take 1 tablet (300 mg total) by mouth every 12 (twelve) hours. 1 Tablet Sustained Release 12HR Oral Twice a day 180 tablet 12    torsemide (DEMADEX) 20 MG Tab Take 1 tablet (20 mg total) by mouth once daily. Take 1 tablet every morning/everyday. 90 tablet 12    tramadol (ULTRAM) 50 mg tablet 1-2 q 6hr prn 180 tablet 5    triamcinolone acetonide 0.1% (KENALOG) 0.1 % ointment AAA hand bid - may occlude qhs 60 g 3    urea (CARMOL) 40 % Crea Apply topically 2  (two) times daily. 120 g 5    VIT C/VIT E/LUTEIN/MIN/OMEGA-3 (OCUVITE ORAL) Take by mouth.      amoxicillin-clavulanate 875-125mg (AUGMENTIN) 875-125 mg per tablet Take 1 tablet by mouth every 12 (twelve) hours. 20 tablet 0    fluticasone (FLONASE) 50 mcg/actuation nasal spray 2 sprays by Each Nare route once daily. 16 g 1    levalbuterol (XOPENEX HFA) 45 mcg/actuation inhaler Inhale 1-2 puffs into the lungs every 4 (four) hours as needed for Wheezing. Rescue 15 g 1     No current facility-administered medications for this visit.        Review of patient's allergies indicates:   Allergen Reactions    Antihistimine Other (See Comments)    Antivert  [meclizine] Rash       Physical examination  Vitals Reviewed  Gen. Well-dressed well-nourished no apparent distress  Skin warm dry and intact.  No rashes noted.  HEENT.  TM intact bilateral with normal light reflex.  No mastoid tenderness during percussion.  Nares patent bilateral.  Pharynx is unremarkable except postnasal drip.  Left maxillary sinus tenderness when percussed.    Neck is supple without adenopathy  Chest.  Respirations are even unlabored.  Lungs are clear to auscultation.  Cardiac regular rate and rhythm.  No chest wall adenopathy noted.  Neuro. Awake alert oriented x4.  Normal judgment and cognition noted.  Extremities no clubbing cyanosis or edema noted.     Call or return to clinic prn if these symptoms worsen or fail to improve as anticipated.

## 2017-05-20 LAB
ESTIMATED AVG GLUCOSE: 143 MG/DL
HBA1C MFR BLD HPLC: 6.6 %

## 2017-05-31 ENCOUNTER — OFFICE VISIT (OUTPATIENT)
Dept: FAMILY MEDICINE | Facility: CLINIC | Age: 82
End: 2017-05-31
Payer: MEDICARE

## 2017-05-31 VITALS
SYSTOLIC BLOOD PRESSURE: 102 MMHG | TEMPERATURE: 99 F | HEART RATE: 65 BPM | HEIGHT: 71 IN | OXYGEN SATURATION: 95 % | WEIGHT: 163.81 LBS | DIASTOLIC BLOOD PRESSURE: 58 MMHG | BODY MASS INDEX: 22.93 KG/M2

## 2017-05-31 DIAGNOSIS — N18.30 CONTROLLED TYPE 2 DIABETES MELLITUS WITH STAGE 3 CHRONIC KIDNEY DISEASE, WITHOUT LONG-TERM CURRENT USE OF INSULIN: ICD-10-CM

## 2017-05-31 DIAGNOSIS — M79.673 PAIN OF FOOT, UNSPECIFIED LATERALITY: Primary | ICD-10-CM

## 2017-05-31 DIAGNOSIS — G89.29 CHRONIC BILATERAL LOW BACK PAIN WITH LEFT-SIDED SCIATICA: ICD-10-CM

## 2017-05-31 DIAGNOSIS — Z01.818 PREOPERATIVE EXAMINATION: ICD-10-CM

## 2017-05-31 DIAGNOSIS — I10 ESSENTIAL HYPERTENSION: ICD-10-CM

## 2017-05-31 DIAGNOSIS — I48.20 CHRONIC ATRIAL FIBRILLATION: ICD-10-CM

## 2017-05-31 DIAGNOSIS — R04.0 EPISTAXIS: ICD-10-CM

## 2017-05-31 DIAGNOSIS — D68.9 COAGULOPATHY: ICD-10-CM

## 2017-05-31 DIAGNOSIS — E11.22 CONTROLLED TYPE 2 DIABETES MELLITUS WITH STAGE 3 CHRONIC KIDNEY DISEASE, WITHOUT LONG-TERM CURRENT USE OF INSULIN: ICD-10-CM

## 2017-05-31 DIAGNOSIS — R60.9 EDEMA, UNSPECIFIED TYPE: ICD-10-CM

## 2017-05-31 DIAGNOSIS — M54.42 CHRONIC BILATERAL LOW BACK PAIN WITH LEFT-SIDED SCIATICA: ICD-10-CM

## 2017-05-31 DIAGNOSIS — N18.3 CKD (CHRONIC KIDNEY DISEASE), STAGE 3 (MODERATE): ICD-10-CM

## 2017-05-31 PROCEDURE — 99999 PR PBB SHADOW E&M-EST. PATIENT-LVL III: CPT | Mod: PBBFAC,,, | Performed by: INTERNAL MEDICINE

## 2017-05-31 PROCEDURE — 99213 OFFICE O/P EST LOW 20 MIN: CPT | Mod: PBBFAC,PO | Performed by: INTERNAL MEDICINE

## 2017-05-31 PROCEDURE — 99215 OFFICE O/P EST HI 40 MIN: CPT | Mod: S$PBB,,, | Performed by: INTERNAL MEDICINE

## 2017-05-31 RX ORDER — TRAMADOL HYDROCHLORIDE 50 MG/1
TABLET ORAL
Qty: 180 TABLET | Refills: 5 | Status: SHIPPED | OUTPATIENT
Start: 2017-05-31 | End: 2017-07-26 | Stop reason: SDUPTHER

## 2017-05-31 NOTE — PROGRESS NOTES
Chief complaint follow-up on blood work and preop    81-year-old white female  Here for preop .  She presents today in consultation from Dr. Rossy cornejo who is apparently a podiatrist at [place].  She did not ring any paperwork.  She needs clearance for monitored anesthesia as she has a bones prior on the left big toe which does cause a burning sensation when anything touches it even a sock.  She has had monitored anesthesia without problems.  She has seen her cardiologist who is clear to get off anticoagulation.  We reviewed her recent lab work showing a rise in her A1c from 6.1-6.6 discussed dietary changes that she can make in follow-up in 3-4 months.  She has had 2-3 nose bleeds from the left.  Last night it lasted about 45 minutes.  She was treated for a sinus infection recently and discussed that it was the inflammation and the anticoagulant would likely related to the nosebleed.  We discussed local care.  Her anemia which is chronic was slightly worse likely referable to the recent blood loss.  Her renal insufficiency is stable and she has follow-up with from [facility]ER[facility].  Her blood pressures under good control.  She has no other infectious symptoms and no other complications related to anesthesia.  She does have back and hip pain for which she takes just one tramadol per day and needs a refill.  She has had an increase in edema lately secondary to salty food and increased her diuretic use and is continuing to elevate.  We discussed getting the edema under control and avoiding salty food prior to the surgery.. Patient counseled at length regarding all these issues.  Her current Medicare insurance does not formally cover a preop consultation but her assessment was done and we will complete her forms when she brings them.Total time over 45 minutes with over 50% counseling.        ROS:   CONST: weight stable. EYES: no vision change. ENT: no sore throat. CV: no chest pain w/ exertion. RESP: no shortness  of breath. GI: no nausea, vomiting, diarrhea. No dysphagia. : no urinary issues. MUSCULOSKELETAL: no new myalgias or arthralgias. SKIN: no new changes. NEURO: no focal deficits. PSYCH: no new issues. ENDOCRINE: no polyuria. HEME: no lymph nodes. ALLERGY: no general pruritis.                                               PAST MEDICAL HISTORY:                                                        1.  [facility]rial [facility], [facility]                     2.  Breast cancer and right mastectomy, left mastectomy  sees Dr. Altamirano.                                                 3.  Total hysterectomy.                                                      4.  Tonsillectomy.                                                           5.  Appendectomy.                                                            6.  Bilateral knee replacements.                                             7.  Asthma.                                                                  8.  Hypertension.                                                            9.  Hyperlipidemia.                                                          10.  Gout.                                                                   11.  Numerous colonoscopies, all normal. Dr. Kohli, repeats every 5 years                                                  12.  DIABETES with A1c up to 6.6                                       13.  Normal-to-elevated bone density .  14.  Lumbar laminectomy  -Dr Martinez                                                                                                                FAMILY HISTORY:  Father  at 45 of stomach cancer.  Mom  at 73 with   colon cancer, but of natural causes.  Two brothers with colon cancer.  Two   sisters; one with asthma, one  of heart failure.                                                                                                      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  Never smoked, occasional alcohol.  She is          , but still lives with her two sons.      Gen: no distress  EYES: conjunctiva clear, non-icteric, PERRL  ENT: nose clear, nasal mucosa normal, oropharynx clear and moist, teeth good  NECK:supple, thyroid non-palpable  RESP: effort is good, lungs clear  CV: heart RRR w/o murmur, gallops or rubs; no carotid bruits, +2 edema  GI: abdomen soft, non-distended, non-tender, no hepatosplenomegaly  MS: gait normal, no clubbing or cyanosis of the digits  SKIN: no rashes, warm to touch    Lexy was seen today for pre-op exam.    Diagnoses and all orders for this visit:    Pain of foot, unspecified laterality, said to have some form of surgical procedure    Preoperative examination, medically cleared for monitored anesthesia and she has been cleared by cardiology to discontinue anticoagulation    Controlled type 2 diabetes mellitus with stage 3 chronic kidney disease, without long-term current use of insulin, rise in A1c to 6.6 which is high or the usual for her and she will adjust diet    CKD (chronic kidney disease), stage 3 (moderate) stable and has follow-up with nephrology    Essential hypertension, chronic and stable    Chronic atrial fibrillation    Epistaxis, may need to refer to ENT if it continues, obviously the recent sinusitis and anticoagulation are contributing    Coagulopathy, secondary to medication    Edema, unspecified type, avoid salty food    Chronic bilateral low back pain with left-sided sciatica, tramadol refilled    Other orders  -     tramadol (ULTRAM) 50 mg tablet; 1-2 q 6hr prn

## 2017-06-09 ENCOUNTER — TELEPHONE (OUTPATIENT)
Dept: FAMILY MEDICINE | Facility: CLINIC | Age: 82
End: 2017-06-09

## 2017-06-12 ENCOUNTER — OFFICE VISIT (OUTPATIENT)
Dept: NEPHROLOGY | Facility: CLINIC | Age: 82
End: 2017-06-12
Payer: MEDICARE

## 2017-06-12 VITALS
HEART RATE: 78 BPM | BODY MASS INDEX: 21.39 KG/M2 | DIASTOLIC BLOOD PRESSURE: 60 MMHG | WEIGHT: 152.75 LBS | SYSTOLIC BLOOD PRESSURE: 100 MMHG | OXYGEN SATURATION: 98 % | HEIGHT: 71 IN

## 2017-06-12 DIAGNOSIS — E11.21 DIABETIC NEPHROPATHY ASSOCIATED WITH TYPE 2 DIABETES MELLITUS: ICD-10-CM

## 2017-06-12 DIAGNOSIS — N18.30 CHRONIC KIDNEY DISEASE (CKD), STAGE III (MODERATE): Primary | ICD-10-CM

## 2017-06-12 DIAGNOSIS — Z85.3 HX: BREAST CANCER: ICD-10-CM

## 2017-06-12 PROCEDURE — 1159F MED LIST DOCD IN RCRD: CPT | Mod: ,,, | Performed by: INTERNAL MEDICINE

## 2017-06-12 PROCEDURE — 99213 OFFICE O/P EST LOW 20 MIN: CPT | Mod: S$PBB,,, | Performed by: INTERNAL MEDICINE

## 2017-06-12 PROCEDURE — 1126F AMNT PAIN NOTED NONE PRSNT: CPT | Mod: ,,, | Performed by: INTERNAL MEDICINE

## 2017-06-12 PROCEDURE — 99999 PR PBB SHADOW E&M-EST. PATIENT-LVL IV: CPT | Mod: PBBFAC,,, | Performed by: INTERNAL MEDICINE

## 2017-06-12 PROCEDURE — 99214 OFFICE O/P EST MOD 30 MIN: CPT | Mod: PBBFAC | Performed by: INTERNAL MEDICINE

## 2017-06-12 NOTE — PROGRESS NOTES
Patient is herfe today for follow up evaluation of CKD III. Last seen in renal office 5/4/16 Baseline Cr 1.1-1.4 mg/dl Her most recent lab (5/19/17) Cr 1.4 mg/dl; eGFR 35 ml/min; potassium 4.6 mmol/L There is hx of diabetes; complicated by nephropathy; last A1c; 6.6% Today she has no new complaints    Exam  Elderly woman; no acue dsitress; oriented x 3  HEENT; WNL  CHEST; Clear P&A; no rales or rhonchi  HEART; RR; S1&S2 no murmur rub gallop  ABD. Power ign  EXT; (-)Edema    Impression   CKD III Stable  Diabetes At goal A1c      Plan  Return Visit; 1 year

## 2017-06-12 NOTE — TELEPHONE ENCOUNTER
Form completed and patient herself likely should probably pick it up some time and bring the original       we can also fax it to [phone]

## 2017-06-16 ENCOUNTER — NURSE TRIAGE (OUTPATIENT)
Dept: ADMINISTRATIVE | Facility: CLINIC | Age: 82
End: 2017-06-16

## 2017-06-16 NOTE — TELEPHONE ENCOUNTER
"  Reason for Disposition   Caller has URGENT medication question about med that PCP prescribed and triager unable to answer question    Answer Assessment - Initial Assessment Questions  1. SYMPTOMS: "Do you have any symptoms?"      N/a Patient needs her B/P medication identified  2. SEVERITY: If symptoms are present, ask "Are they mild, moderate or severe?"      n/a    Protocols used: ST MEDICATION QUESTION CALL-A-    "

## 2017-07-12 ENCOUNTER — HOSPITAL ENCOUNTER (OUTPATIENT)
Dept: RADIOLOGY | Facility: HOSPITAL | Age: 82
Discharge: HOME | End: 2017-07-12
Attending: NURSE PRACTITIONER
Payer: MEDICARE

## 2017-07-12 ENCOUNTER — TELEPHONE (OUTPATIENT)
Dept: FAMILY MEDICINE | Facility: CLINIC | Age: 82
End: 2017-07-12

## 2017-07-12 ENCOUNTER — OFFICE VISIT (OUTPATIENT)
Dept: FAMILY MEDICINE | Facility: CLINIC | Age: 82
End: 2017-07-12
Payer: MEDICARE

## 2017-07-12 VITALS
WEIGHT: 149.94 LBS | HEIGHT: 71 IN | SYSTOLIC BLOOD PRESSURE: 98 MMHG | OXYGEN SATURATION: 96 % | TEMPERATURE: 98 F | BODY MASS INDEX: 20.99 KG/M2 | HEART RATE: 80 BPM | DIASTOLIC BLOOD PRESSURE: 50 MMHG
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
## 2017-07-12 DIAGNOSIS — M79.641 HAND PAIN, RIGHT: ICD-10-CM

## 2017-07-12 DIAGNOSIS — M25.441 SWELLING OF FINGER JOINT OF RIGHT HAND: ICD-10-CM

## 2017-07-12 DIAGNOSIS — M25.539 WRIST PAIN, ACUTE, UNSPECIFIED LATERALITY: ICD-10-CM

## 2017-07-12 PROCEDURE — 3078F DIAST BP <80 MM HG: CPT | Mod: ,,, | Performed by: NURSE PRACTITIONER

## 2017-07-12 PROCEDURE — 99214 OFFICE O/P EST MOD 30 MIN: CPT | Mod: S$PBB,,, | Performed by: NURSE PRACTITIONER

## 2017-07-12 PROCEDURE — 1159F MED LIST DOCD IN RCRD: CPT | Mod: ,,, | Performed by: NURSE PRACTITIONER

## 2017-07-12 PROCEDURE — 3074F SYST BP LT 130 MM HG: CPT | Mod: ,,, | Performed by: NURSE PRACTITIONER

## 2017-07-12 PROCEDURE — 73130 X-RAY EXAM OF HAND: CPT | Mod: TC,PO,RT

## 2017-07-12 PROCEDURE — 99999 PR PBB SHADOW E&M-EST. PATIENT-LVL V: CPT | Mod: PBBFAC,,, | Performed by: NURSE PRACTITIONER

## 2017-07-12 PROCEDURE — 1125F AMNT PAIN NOTED PAIN PRSNT: CPT | Mod: ,,, | Performed by: NURSE PRACTITIONER

## 2017-07-12 PROCEDURE — 3008F BODY MASS INDEX DOCD: CPT | Mod: ,,, | Performed by: NURSE PRACTITIONER

## 2017-07-12 PROCEDURE — 73130 X-RAY EXAM OF HAND: CPT | Mod: 26,RT,, | Performed by: RADIOLOGY

## 2017-07-12 PROCEDURE — 99215 OFFICE O/P EST HI 40 MIN: CPT | Mod: PBBFAC,25,PO | Performed by: NURSE PRACTITIONER

## 2017-07-12 RX ORDER — OXYCODONE AND ACETAMINOPHEN 5; 325 MG/1; MG/1
TABLET ORAL
Refills: 0 | COMMUNITY
Start: 2017-06-16 | End: 2017-07-12

## 2017-07-12 RX ORDER — METOLAZONE 2.5 MG/1
TABLET ORAL
COMMUNITY
Start: 2017-06-07 | End: 2017-10-26 | Stop reason: SDUPTHER

## 2017-07-12 RX ORDER — SERTRALINE HYDROCHLORIDE 50 MG/1
50 TABLET, FILM COATED ORAL DAILY
COMMUNITY
End: 2018-01-29 | Stop reason: SDUPTHER

## 2017-07-12 NOTE — TELEPHONE ENCOUNTER
----- Message from Eleonora Vela sent at 7/12/2017  9:55 AM CDT -----  Contact: mae-dr jamari Garcia is requesting a sooner ed f/u appt than [phone]. [phone]

## 2017-07-12 NOTE — TELEPHONE ENCOUNTER
Left message to call office to schedule next available appointment. Please schedule when patient calls back.

## 2017-07-14 ENCOUNTER — HOSPITAL ENCOUNTER (EMERGENCY)
Facility: OTHER | Age: 82
Discharge: ANOTHER HOSPITAL | End: 2017-07-14
Attending: EMERGENCY MEDICINE
Payer: MEDICARE

## 2017-07-14 VITALS
HEART RATE: 63 BPM | DIASTOLIC BLOOD PRESSURE: 60 MMHG | SYSTOLIC BLOOD PRESSURE: 127 MMHG | BODY MASS INDEX: 20.3 KG/M2 | HEIGHT: 71 IN | OXYGEN SATURATION: 99 % | WEIGHT: 145 LBS | RESPIRATION RATE: 17 BRPM | TEMPERATURE: 99 F

## 2017-07-14 DIAGNOSIS — S00.83XD FACIAL CONTUSION, SUBSEQUENT ENCOUNTER: ICD-10-CM

## 2017-07-14 DIAGNOSIS — R79.89 ELEVATED TROPONIN: ICD-10-CM

## 2017-07-14 DIAGNOSIS — I48.91 ATRIAL FIBRILLATION: ICD-10-CM

## 2017-07-14 DIAGNOSIS — M25.512 ACUTE PAIN OF BOTH SHOULDERS: Primary | ICD-10-CM
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
## 2017-07-14 DIAGNOSIS — M25.511 ACUTE PAIN OF BOTH SHOULDERS: Primary | ICD-10-CM

## 2017-07-14 DIAGNOSIS — M25.569 PAIN, KNEE: ICD-10-CM

## 2017-07-14 LAB
ALBUMIN SERPL-MCNC: 3.4 G/DL (ref 3.3–5.5)
ALP SERPL-CCNC: 52 U/L (ref 42–141)
BILIRUB SERPL-MCNC: 1 MG/DL (ref 0.2–1.6)
BILIRUBIN, POC UA: NEGATIVE
BLOOD, POC UA: ABNORMAL
BUN SERPL-MCNC: 19 MG/DL (ref 7–22)
CALCIUM SERPL-MCNC: 9.9 MG/DL (ref 8–10.3)
CHLORIDE SERPL-SCNC: 95 MMOL/L (ref 98–108)
CLARITY, POC UA: CLEAR
COLOR, POC UA: YELLOW
CREAT SERPL-MCNC: 1.2 MG/DL (ref 0.6–1.2)
GLUCOSE SERPL-MCNC: 168 MG/DL (ref 73–118)
GLUCOSE, POC UA: NEGATIVE
KETONES, POC UA: NEGATIVE
LEUKOCYTE EST, POC UA: NEGATIVE
NITRITE, POC UA: NEGATIVE
PH UR STRIP: 7 [PH]
POC ALT (SGPT): 8 U/L (ref 10–47)
POC AST (SGOT): 21 U/L (ref 11–38)
POC CARDIAC TROPONIN I: 0.13 NG/ML
POC CARDIAC TROPONIN I: 0.15 NG/ML
POC CKMB: <1 NG/ML (ref 0–4.3)
POC MYOGLOBIN: 139 NG/ML (ref 0–107)
POC PTINR: 1.5 (ref 0.9–1.2)
POC PTWBT: 17.7 SEC (ref 9.7–14.3)
POC TCO2: 31 MMOL/L (ref 18–33)
POC TROPONIN I: <0.05 NG/ML (ref 0–0.4)
POTASSIUM BLD-SCNC: 3.9 MMOL/L (ref 3.6–5.1)
PROTEIN, POC UA: NEGATIVE
PROTEIN, POC: 7.3 G/DL (ref 6.4–8.1)
SAMPLE: ABNORMAL
SODIUM BLD-SCNC: 138 MMOL/L (ref 128–145)
SPECIFIC GRAVITY, POC UA: 1.01
UROBILINOGEN, POC UA: 0.2 E.U./DL

## 2017-07-14 PROCEDURE — 99285 EMERGENCY DEPT VISIT HI MDM: CPT | Mod: 25

## 2017-07-14 PROCEDURE — 96360 HYDRATION IV INFUSION INIT: CPT

## 2017-07-14 PROCEDURE — 25000003 PHARM REV CODE 250: Performed by: EMERGENCY MEDICINE

## 2017-07-14 PROCEDURE — 85610 PROTHROMBIN TIME: CPT

## 2017-07-14 PROCEDURE — 90471 IMMUNIZATION ADMIN: CPT | Performed by: EMERGENCY MEDICINE

## 2017-07-14 PROCEDURE — 84484 ASSAY OF TROPONIN QUANT: CPT

## 2017-07-14 PROCEDURE — 93005 ELECTROCARDIOGRAM TRACING: CPT

## 2017-07-14 PROCEDURE — 83874 ASSAY OF MYOGLOBIN: CPT

## 2017-07-14 PROCEDURE — 81003 URINALYSIS AUTO W/O SCOPE: CPT

## 2017-07-14 PROCEDURE — 85025 COMPLETE CBC W/AUTO DIFF WBC: CPT

## 2017-07-14 PROCEDURE — 80053 COMPREHEN METABOLIC PANEL: CPT

## 2017-07-14 PROCEDURE — 90718 PHARM REV CODE 250: CPT | Performed by: EMERGENCY MEDICINE

## 2017-07-14 PROCEDURE — 93010 ELECTROCARDIOGRAM REPORT: CPT | Mod: ,,, | Performed by: INTERNAL MEDICINE

## 2017-07-14 RX ORDER — ASPIRIN 325 MG
325 TABLET ORAL
Status: COMPLETED | OUTPATIENT
Start: 2017-07-14 | End: 2017-07-14

## 2017-07-14 RX ORDER — TRAMADOL HYDROCHLORIDE 50 MG/1
50 TABLET ORAL ONCE
Status: COMPLETED | OUTPATIENT
Start: 2017-07-14 | End: 2017-07-14

## 2017-07-14 RX ADMIN — SODIUM CHLORIDE 1000 ML: 0.9 INJECTION, SOLUTION INTRAVENOUS at 11:07

## 2017-07-14 RX ADMIN — ASPIRIN 325 MG ORAL TABLET 325 MG: 325 PILL ORAL at 11:07

## 2017-07-14 RX ADMIN — TRAMADOL HYDROCHLORIDE 50 MG: 50 TABLET, COATED ORAL at 11:07

## 2017-07-14 RX ADMIN — TETANUS AND DIPHTHERIA TOXOIDS ADSORBED 0.5 ML: 2; 2 INJECTION INTRAMUSCULAR at 11:07

## 2017-07-14 NOTE — ED NOTES
Patient, and  family informed of patient being transferred to [facility] , transfer center called with information, awaiting call back

## 2017-07-14 NOTE — ED TRIAGE NOTES
Fell several days ago / Facial Swelling and discoloration / shoulder pain / R arm pain and LE pain

## 2017-07-14 NOTE — ED NOTES
Called to check the status of patient transport, spoke to dispatch states it will be 30 minutes before a truck will pick the patient up, patient and family notified of the status

## 2017-07-14 NOTE — ED NOTES
COMPLAINT: patient to room 6, trip and fall 3 days ago, seen at [facility] on Tuesday, c/o increased pain to right arm, right elbow.[id number].     HEAD:right eye bruising, purplish/yellowish in color, no swelling noted to eye, denies vision changes     PUPILS:PERRLA    RESPIRATORY: lungs CTA in all lobes, bilateral mastectomy     CARDIAC: AFIB, paced rhythm per EKG, pacemaker to right chest wall     ABDOMEN:firm, round, non tender to touch, positive BS in all quads     : no compliant noted    MUSCULOSKELETAL: bruise to right shoulder yellow/purplish in color, right knee bruise yellow in color    SKIN: skin tear to right elbow, dressing dry, intact, no bleeding noted

## 2017-07-14 NOTE — ED PROVIDER NOTES
06/09/20        Pop Eldridge Jr.   [address]  Sam Barbosa      Dear Radha Fontenot records indicate that you have outstanding lab work and or testing that was ordered for you and has not yet been completed:  Orders Placed This Encou Encounter Date: 7/14/2017       History     Chief Complaint   Patient presents with    Fall     Several days ago     Lexy Conteh is a 81 y.o. female who presents to the Emergency Department with  Right shoulder, arm, elbow pain, arm pain, right face pain.  She states she continues to feel really bad and her pain in her shoulders are getting worse.  Patient states it is very painful to move.  Fell Tuesday when her slipper caught a rug.  Went to [facility] ED they checked her head and sent her home..  Saw PCP office NP Wednesday.  Patient states she takes one Ultram at night for her ongoing pains.  Patient states is not helping.  Patient states her cardiologist is Dr. Burton at [facility].  When she went to [place] emergency Department they just sent her home.        The history is provided by the patient.   Fall   The accident occurred several days ago. The fall occurred while walking. She fell from a height of 3 to 5 ft. She landed on concrete. The volume of blood lost was minimal. The point of impact was the head and right elbow. The pain is present in the right elbow, face and right hand. The pain is at a severity of 10/10. Associated symptoms include neck pain. Pertinent negatives include no visual change, no fever, no numbness, no abdominal pain, no bowel incontinence, no nausea, no vomiting, no headaches, no hearing loss, no loss of consciousness and no tingling. The symptoms are aggravated by activity, pressure on the injury and extension. She has tried nothing for the symptoms. The treatment provided no relief.     Review of patient's allergies indicates:   Allergen Reactions    Antihistimine Other (See Comments)    Antivert  [meclizine] Rash     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    Atrial fibrillation 9/7/2012    Blood clotting tendency     Breast cancer      Chronic LBP 4/28/2015    CKD (chronic kidney disease) 6/30/2015    Diabetes mellitus type II, uncontrolled 12/11/2012    Diabetes mellitus with renal manifestations, controlled     Family history of colon cancer 10/7/2015    Fever blister     HTN (hypertension) 9/7/2012    Hyperlipemia     Hyperlipidemia 12/11/2012    Intrinsic asthma, unspecified 12/11/2012    Keloid cicatrix     Pacemaker     SQUAMOUS CELL CARCINOMA 7/2013    right dorsal hand     Past Surgical History:   Procedure Laterality Date    CERVICAL DISC SURGERY  1990's    C5 & C6    KNEE SURGERY  1-    bilateral TKR    LUMBAR LAMINECTOMY      [place] 2014    MASTECTOMY Right     2 yrs later had left side done     Family History   Problem Relation Age of Onset    Melanoma Neg Hx     Eczema Neg Hx     Psoriasis Neg Hx      Social History   Substance Use Topics    Smoking status: Never Smoker    Smokeless tobacco: Never Used    Alcohol use No     Review of Systems   Constitutional: Negative for fever.   HENT: Negative for trouble swallowing.    Respiratory: Negative for shortness of breath.    Cardiovascular: Negative for chest pain.   Gastrointestinal: Negative for abdominal pain, bowel incontinence, nausea and vomiting.   Musculoskeletal: Positive for arthralgias, joint swelling, myalgias and neck pain.   Neurological: Negative for tingling, loss of consciousness, numbness and headaches.   All other systems reviewed and are negative.      Physical Exam     Initial Vitals   BP Pulse Resp Temp SpO2   --133/64  --60  --16  --98.3  --98%       MAP       --         Physical Exam    Nursing note and vitals reviewed.  Constitutional: She appears well-developed and well-nourished.   HENT:   Head: Normocephalic.   Right Ear: External ear normal.   Left Ear: External ear normal.   Nose: Nose normal.   Eyes: Conjunctivae and EOM are normal. Pupils are equal, round, and reactive to light.   Neck: Normal range of motion. Neck supple. No  tracheal deviation present.   Cardiovascular: Normal rate, regular rhythm, normal heart sounds and intact distal pulses. Exam reveals no gallop and no friction rub.    No murmur heard.  Pulmonary/Chest: Breath sounds normal. No respiratory distress. She has no wheezes. She has no rhonchi. She has no rales. She exhibits tenderness.   Abdominal: Soft. Bowel sounds are normal. She exhibits no distension and no mass. There is no tenderness. There is no rebound and no guarding.   Musculoskeletal: She exhibits edema and tenderness.        Right shoulder: She exhibits decreased range of motion and tenderness.        Left shoulder: Normal. She exhibits no bony tenderness.        Right elbow: She exhibits decreased range of motion and deformity.        Left elbow: Normal. No tenderness found.        Right wrist: She exhibits tenderness, bony tenderness and swelling.        Left wrist: Normal. She exhibits no bony tenderness.        Right hip: Normal.        Left hip: Normal.        Right knee: She exhibits swelling. Tenderness found.        Left knee: Normal.        Right ankle: Normal.        Left ankle: Normal.        Cervical back: She exhibits tenderness, pain and spasm.        Thoracic back: Normal. She exhibits no bony tenderness.        Lumbar back: Normal. She exhibits no bony tenderness.   Neurological: She is alert and oriented to person, place, and time. She has normal strength. No cranial nerve deficit or sensory deficit.   Skin: Skin is warm and dry. Capillary refill takes 2 to 3 seconds.   Psychiatric: She has a normal mood and affect.         ED Course   Critical Care  Date/Time: 7/14/2017 4:45 PM  Performed by: KISHORE GONZALEZ  Authorized by: KISHORE GONZALEZ   Direct patient critical care time: 15 minutes  Additional history critical care time: 5 minutes  Ordering / reviewing critical care time: 15 minutes  Documentation critical care time: 10 minutes  Consulting other physicians critical care time: 10  minutes  Total critical care time (exclusive of procedural time) : 55 minutes  Critical care was time spent personally by me on the following activities: evaluation of patient's response to treatment, obtaining history from patient or surrogate, ordering and review of laboratory studies, pulse oximetry, re-evaluation of patient's condition, ordering and review of radiographic studies, ordering and performing treatments and interventions and examination of patient.        Labs Reviewed   TROPONIN ISTAT - Abnormal; Notable for the following:        Result Value    POC Cardiac Troponin I 0.15 (*)     All other components within normal limits   ISTAT PROCEDURE - Abnormal; Notable for the following:     POC PTWBT 17.7 (*)     POC PTINR 1.5 (*)     All other components within normal limits   POCT CMP - Abnormal; Notable for the following:     ALT (SGPT), POC 8 (*)     POC Chloride 95 (*)     POC Creatinine 1.2 (*)     POC Glucose 168 (*)     All other components within normal limits   POCT CBC   POCT URINALYSIS W/O SCOPE   POCT PROTIME-INR   POCT CMP   POCT TROPONIN   POCT CARDIAC PANEL    troponin is elevated at 0.15.    CBC White blood cell count 7.1 hemoglobin 10.9 hematocrit 33.9 and platelets 195.  INR is 1.5.  CMP sodium 138 potassium 3.9 glucose 168 be UN 19 and creatinine 1.2  EKG Readings: (Independently Interpreted)   Initial Reading: No STEMI. Rhythm: Atrial Fibrillation. Heart Rate: 72. Axis: Left Axis Deviation. Other Impression: Atrial fibrillation QTC normal at 14.  Nonspecific ST and T-wave abnormalities.     Imaging Results          X-Ray Knee 3 View Right (Final result)  Result time 07/14/17 11:38:40    Final result by Rebeka Robertson MD (07/14/17 11:38:40)                 Impression:     The patient is status post total right knee arthroplasty.      Electronically signed by: REBEKA ROBERTSON MD  Date:     07/14/17  Time:    11:38              Narrative:    Right knee    3 view    Patient is status post  total right knee arthroplasty. There is no evidence of periprosthetic lucency or fracture. The adjacent soft tissues are unremarkable.                             X-Ray Elbow Complete Right (Final result)  Result time 07/14/17 11:35:33    Final result by Brandin Velasquez MD (07/14/17 11:35:33)                 Impression:        As above.      Electronically signed by: BRANDIN VELASQUEZ MD  Date:     07/14/17  Time:    11:35              Narrative:    History: Patient for    Procedure: Right elbow 3 views    Findings:    There are no acute fractures or dislocations or joint effusions.  No radiopaque foreign bodies in the soft tissues.                             X-Ray Forearm Right (Final result)  Result time 07/14/17 11:36:21    Final result by Brandin Velasquez MD (07/14/17 11:36:21)                 Impression:        As above.      Electronically signed by: BRANDIN VELASQUEZ MD  Date:     07/14/17  Time:    11:36              Narrative:    History: Fall.    Procedure: Right forearm 2 views    Findings:    There are no acute fractures or dislocations.  The soft tissues are unremarkable.                             X-Ray Wrist Complete Right (Final result)  Result time 07/14/17 11:32:26    Final result by Brandin Velasquez MD (07/14/17 11:32:26)                 Impression:        DJD as above.  Chondrocalcinosis.      Electronically signed by: BRANDIN VELASQUEZ MD  Date:     07/14/17  Time:    11:32              Narrative:    History: Patient fell.    Procedure: The right wrist 3 views    Findings:    There are no acute fractures.  There is severe DJD of the 1st carpometacarpal joint.  Ossific DJD involving the radiocarpal joints.  No carpal bone fractures.    There is calcification in the region of the triangular ligament, suspicious for chondrocalcinosis.  Incidental note of severe DJD of the 2nd metacarpophalangeal joint also noted.  There is also DJD of the 1st metacarpophalangeal joint and interphalangeal joint of the thumb.                              X-Ray Humerus 2 View Right (Final result)  Result time 07/14/17 11:32:45    Final result by Rebeka Robertson MD (07/14/17 11:32:45)                 Impression:     There is no evidence acute injury of the right humerus.      Electronically signed by: REBEKA ROBERTSON MD  Date:     07/14/17  Time:    11:32              Narrative:    Right humerus    There is no evidence fracture normal alignment. The adjacent soft tissues are unremarkable. There are right axillary surgical clips.                             X-Ray Shoulder Trauma Right (Final result)  Result time 07/14/17 11:39:06    Final result by Rebeka Robertson MD (07/14/17 11:39:06)                 Impression:     There is no evidence acute injury of the right shoulder.      Electronically signed by: REBEKA ROBERTSON MD  Date:     07/14/17  Time:    11:39              Narrative:    Right shoulder    3 view    There is no evidence fracture nor malalignment. The adjacent soft tissues are unremarkable. There are surgical clips in the right axilla.                             X-Ray Chest PA And Lateral (Final result)  Result time 07/14/17 11:34:49    Final result by Brandin Velasquez MD (07/14/17 11:34:49)                 Impression:        No acute cardiopulmonary processes.  2.  Bilateral mastectomies.        Electronically signed by: BRANDIN VELASQUEZ MD  Date:     07/14/17  Time:    11:34              Narrative:    History: Atrial fibrillation.    Procedure: Chest 2 views    Findings:    The examination is compared to study of 3/21/2011.    There is borderline cardiomegaly.  Calcification of the aortic arch with slight tortuosity of the descending thoracic aorta.    Lungs are well inflated.  No pulmonary consolidations or pneumothorax or pulmonary vascular congestion.  There is a nodular density along the anterior end of 6th rib, unchanged on previous examination and most likely related to the costochondral junction.  Postop changes from bilateral  mastectomies.    The position of the transvenous pacemaker remains without change.                             CT Head Without Contrast (Final result)  Result time 07/14/17 11:22:33    Final result by Rebeka Robertson MD (07/14/17 11:22:33)                 Impression:     No evidence acute intracranial abnormality.      Electronically signed by: REBEKA ROBERTSON MD  Date:     07/14/17  Time:    11:22              Narrative:    CT head without contrast    History: Fall    Comparison: None    Technique: Contiguous axial images were acquired from vertex to skull base without contrast.    Findings: There is no evidence acute intracranial abnormality.  Specifically there is no evidence acute infarct, contusion, extra-axial fluid collection or midline shift.  The ventricles are normal in size and configuration.  The calvarium is intact.  The paranasal sinuses and mastoid air cells are clear.                             CT Cervical Spine Without Contrast (Final result)  Result time 07/14/17 11:25:13    Final result by Rebeka Robertson MD (07/14/17 11:25:13)                 Impression:     There are degenerative changes seen throughout the cervical spine.      Electronically signed by: REBEKA ROBERTSON MD  Date:     07/14/17  Time:    11:25              Narrative:    CT cervical spine    There is no evidence fracture nor malalignment. There are degenerative changes seen throughout the cervical spine. The patient has a central venous catheter.    The adjacent soft tissues show subcentimeter lymph nodes but no evidence cervical lymphadenopathy and surgical clips within the left neck. There is scarring at the pulmonary apices.                             CT Maxillofacial Without Contrast (Final result)  Result time 07/14/17 11:21:17    Final result by Rebeka Robertson MD (07/14/17 11:21:17)                 Impression:     There is no evidence of facial fracture.      Electronically signed by: REBEKA ROBERTSON MD  Date:      07/14/17  Time:    11:21              Narrative:    CT face without contrast    There is no evidence facial fracture. Bilateral OMC complexes and frontoethmoidal recesses are patent. There are degenerative changes of the spine. The patient is status is bilateral cataract surgery. There is soft tissue swelling overlying the right frontal bone.                                                     ED Course       Medical decision making   Chief complaint: Trip and fall on Tuesday.  Patient states she's continuing to have pains her right shoulder hurts worse than before.  Differential diagnosis: Skull fracture, facial fracture, cervical spine fracture, humeral fracture, radial fracture, ulnar fracture, wrist fracture, strain, sprain, contusion, abrasion, STEMI, NSTEMI and concussion  Treatment in the ED Physical Exam, Ultram for pain, tetanus updated  Patient states she saw her primary care on Wednesday.  She takes Ultram at night for pain.  Patient states pain is completely uncontrolled.  Patient with elevated troponin in the ED.  Discussed results and need for admission with patient she would prefer not to be admitted will repeat labs.  Patient is willing to wait for repeat blood work if repeat blood work is abnormal she will consider hospital admission.  Spoke with patient length she does not want to be admitted to [facility] she is requesting admission at [facility] where her cardiologist Dr. Burton is.  Spoke with on-call physician Dr. Cabezas who recommends [facility] ED transfer per patient to obtain further evaluation and possible admission due to elevated cardiac enzymes.  Discussed patient's past medical history which includes chronic A. fib pacemaker diabetes M bilateral mastectomies 5 years ago.  Per chart review patient is on a pradaxa, dig and Zocor.  No mention in the prior notes of an elevated troponin.  Patient troponin is elevated here in the ED we'll send patient to [facility] emergency department for  further care and evaluation.  Patient was accepted for ED to ED transfer per Dr. Whitney.  Patient resting comfortably at time of transfer.  Clinical Impression:   The primary encounter diagnosis was Acute pain of both shoulders. Diagnoses of Atrial fibrillation, Pain, knee, Fall, Elevated troponin, and Facial contusion, subsequent encounter were also pertinent to this visit.                           Corinne Corral DO  07/14/17 [id number]

## 2017-07-26 ENCOUNTER — OFFICE VISIT (OUTPATIENT)
Dept: FAMILY MEDICINE | Facility: CLINIC | Age: 82
End: 2017-07-26
Payer: MEDICARE

## 2017-07-26 VITALS
SYSTOLIC BLOOD PRESSURE: 110 MMHG | WEIGHT: 152.56 LBS | BODY MASS INDEX: 21.36 KG/M2 | OXYGEN SATURATION: 98 % | HEART RATE: 75 BPM | DIASTOLIC BLOOD PRESSURE: 80 MMHG | HEIGHT: 71 IN | TEMPERATURE: 99 F

## 2017-07-26 DIAGNOSIS — Z91.81 HISTORY OF RECENT FALL: ICD-10-CM

## 2017-07-26 DIAGNOSIS — M67.40 GANGLION CYST: ICD-10-CM

## 2017-07-26 DIAGNOSIS — I10 ESSENTIAL HYPERTENSION: ICD-10-CM

## 2017-07-26 DIAGNOSIS — N18.30 CONTROLLED TYPE 2 DIABETES MELLITUS WITH STAGE 3 CHRONIC KIDNEY DISEASE, WITHOUT LONG-TERM CURRENT USE OF INSULIN: ICD-10-CM

## 2017-07-26 DIAGNOSIS — S00.83XD CONTUSION OF FACE, SUBSEQUENT ENCOUNTER: Primary | ICD-10-CM

## 2017-07-26 DIAGNOSIS — M25.531 RIGHT WRIST PAIN: ICD-10-CM

## 2017-07-26 DIAGNOSIS — I48.20 CHRONIC ATRIAL FIBRILLATION: ICD-10-CM

## 2017-07-26 DIAGNOSIS — E11.22 CONTROLLED TYPE 2 DIABETES MELLITUS WITH STAGE 3 CHRONIC KIDNEY DISEASE, WITHOUT LONG-TERM CURRENT USE OF INSULIN: ICD-10-CM

## 2017-07-26 PROCEDURE — 99214 OFFICE O/P EST MOD 30 MIN: CPT | Mod: S$PBB,,, | Performed by: INTERNAL MEDICINE

## 2017-07-26 PROCEDURE — 99999 PR PBB SHADOW E&M-EST. PATIENT-LVL III: CPT | Mod: PBBFAC,,, | Performed by: INTERNAL MEDICINE

## 2017-07-26 PROCEDURE — 99213 OFFICE O/P EST LOW 20 MIN: CPT | Mod: PBBFAC,PO | Performed by: INTERNAL MEDICINE

## 2017-07-26 PROCEDURE — 1159F MED LIST DOCD IN RCRD: CPT | Mod: ,,, | Performed by: INTERNAL MEDICINE

## 2017-07-26 PROCEDURE — 1125F AMNT PAIN NOTED PAIN PRSNT: CPT | Mod: ,,, | Performed by: INTERNAL MEDICINE

## 2017-07-26 RX ORDER — TRAMADOL HYDROCHLORIDE 50 MG/1
TABLET ORAL
Qty: 180 TABLET | Refills: 5 | Status: SHIPPED | OUTPATIENT
Start: 2017-07-26 | End: 2018-01-29 | Stop reason: SDUPTHER

## 2017-07-26 NOTE — LETTER
July 26, 2017      Lexy Conteh   [address] [facility] [address]             [address] [address]  Phone: [phone]  Fax: [phone] Lexy Conteh    Was treated here on 07/26/2017    May Return to work/school on 07/27/2017    No Restrictions            Ronny Mathis MD

## 2017-07-26 NOTE — PROGRESS NOTES
"Chief complaint follow-up hosp    81-year-old white female  Hospital records reviewed although she was transferred to [place] because her cardiologist Dr. Burton is there and she had an elevated troponin after a fall.  She did have a complete set of repeat x-rays in no was no obvious underlying significant damage or broken bones after her fall.  She is here with her son.  Apparently there is a step up from her garage to her kitchen and she has tripped on it before.  This was a simple trip.  Her bruising is going away.  She has a little bit of swelling and a new ganglion on the dorsal aspect of the right wrist.  He was getting better but then she played cards and her wrist is hurting a little bit more.  We discussed giving him more time and reassured her about the ganglion which is about 1 x 1" diffuse over the dorsal aspect of the right wrist.  I reviewed outside records from [place].  She was told she might be a little dehydrated and to stop taking the diuretic.  We did discuss all the negative issues with diuretic and I would like her to use it just as needed for periodic edema.  It doesn't appear that volume depletion contributed to this recent fall.  Her GFR in the hospital was 42 which is actually about baseline for her.  Her A1c back in May was 6.6 and most recently at the hospital improved to 6.3.    She was told that her subsequent cardiac markers were unremarkable and no other further cardiac problems were suspected or needed workup    Medical decision making   Chief complaint: Trip and fall on Tuesday.  Patient states she's continuing to have pains her right shoulder hurts worse than before.  Differential diagnosis: Skull fracture, facial fracture, cervical spine fracture, humeral fracture, radial fracture, ulnar fracture, wrist fracture, strain, sprain, contusion, abrasion, STEMI, NSTEMI and concussion  Treatment in the ED Physical Exam, Ultram for pain, tetanus updated  Patient states she " saw her primary care on Wednesday.  She takes Ultram at night for pain.  Patient states pain is completely uncontrolled.  Patient with elevated troponin in the ED.  Discussed results and need for admission with patient she would prefer not to be admitted will repeat labs.  Patient is willing to wait for repeat blood work if repeat blood work is abnormal she will consider hospital admission.  Spoke with patient length she does not want to be admitted to [facility] she is requesting admission at [facility] where her cardiologist Dr. Burton is.  Spoke with on-call physician Dr. Cabeazs who recommends [facility] ED transfer per patient to obtain further evaluation and possible admission due to elevated cardiac enzymes.  Discussed patient's past medical history which includes chronic A. fib pacemaker diabetes M bilateral mastectomies 5 years ago.  Per chart review patient is on a pradaxa, dig and Zocor.  No mention in the prior notes of an elevated troponin.  Patient troponin is elevated here in the ED we'll send patient to [facility] [place] emergency department for further care and evaluation.  Patient was accepted for ED to ED transfer per Dr. Whitney.  Patient resting comfortably at time of transfer.  Clinical Impression:   The primary encounter diagnosis was Acute pain of both shoulders. Diagnoses of Atrial fibrillation, Pain, knee, Fall, Elevated troponin, and Facial contusion, subsequent encounter were also pertinent to this visit          ROS:   CONST: weight stable. EYES: no vision change. ENT: no sore throat. CV: no chest pain w/ exertion. RESP: no shortness of breath. GI: no nausea, vomiting, diarrhea. No dysphagia. : no urinary issues. MUSCULOSKELETAL: no new myalgias or arthralgias. SKIN: no new changes. NEURO: no focal deficits. PSYCH: no new issues. ENDOCRINE: no polyuria. HEME: no lymph nodes. ALLERGY: no general pruritis.                                               PAST MEDICAL HISTORY:                                                         1.  Atrial fibrillation, [facility].                     2.  Breast cancer and right mastectomy, left mastectomy  sees Dr. Altamirano.                                                 3.  Total hysterectomy.                                                      4.  Tonsillectomy.                                                           5.  Appendectomy.                                                            6.  Bilateral knee replacements.                                             7.  Asthma.                                                                  8.  Hypertension.                                                            9.  Hyperlipidemia.                                                          10.  Gout.                                                                   11.  Numerous colonoscopies, all normal. Dr. Kohli, repeats every 5 years                                                  12.  DIABETES with A1c up to 6.6                                       13.  Normal-to-elevated bone density .  14.  Lumbar laminectomy  -Dr Martinez                                                                                                                FAMILY HISTORY:  Father  at 45 of stomach cancer.  Mom  at 73 with   colon cancer, but of natural causes.  Two brothers with colon cancer.  Two   sisters; one with asthma, one  of heart failure.                                                                                                      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  Never smoked, occasional alcohol.  She is          , but still lives with her two sons.      Gen: no distress  EYES: conjunctiva clear, non-icteric, PERRL, she does have a greenish bruise over the right face which is improving  ENT: nose clear, nasal mucosa normal, oropharynx clear  and moist, teeth good  NECK:supple, thyroid non-palpable  MS: gait normal, no clubbing or cyanosis of the digits, ganglion over the dorsal aspect of the right wrist with some arthritic changes but otherwise full range of motion of the wrists on both sides left and right without pain, all joints stable, strength 5 out of 5.  No signs of tendinitis, no crepitus or pain at the base of the thumbs  SKIN: no rashes, warm to touch, bruising over the right face as above        Lexy was seen today for fall and er follow up.    Diagnoses and all orders for this visit:    Contusion of face, subsequent encounter, doing well, okay to return back to work    History of recent fall, does not appear to be orthostasis but would advise her to use her diuretic only when needed to treat edema to avoid increased risk of falls    Essential hypertension, chronic and stable    Chronic atrial fibrillation, apparently stable    Controlled type 2 diabetes mellitus with stage 3 chronic kidney disease, without long-term current use of insulin, stable, A1c actually improved    Right wrist pain, likely a strain related to activity in the recent fall    Ganglion cyst, reassured

## 2017-08-10 NOTE — PROGRESS NOTES
Subjective:       Patient ID: Lexy Conteh is a 81 y.o. female.    Chief Complaint: Fall (hurt right side/hand swelling)    Fall   The accident occurred 5 to 7 days ago. The fall occurred while walking. She fell from a height of 3 to 5 ft. There was no blood loss. The point of impact was the right wrist. The pain is present in the right hand and right wrist. The pain is at a severity of 6/10. The pain is moderate. The symptoms are aggravated by use of injured limb. Pertinent negatives include no abdominal pain, bowel incontinence, fever, headaches, hearing loss, hematuria, loss of consciousness, nausea, numbness, tingling, visual change or vomiting. She has tried elevation and ice for the symptoms.     Review of Systems   Constitutional: Negative for fever.   Gastrointestinal: Negative for abdominal pain, bowel incontinence, nausea and vomiting.   Genitourinary: Negative for hematuria.   Musculoskeletal: Positive for arthralgias and joint swelling. Negative for back pain, gait problem, myalgias, neck pain and neck stiffness.   Skin: Negative for color change and wound.   Neurological: Negative for tingling, loss of consciousness, numbness and headaches.       Objective:      Physical Exam   Constitutional: She is oriented to person, place, and time. Vital signs are normal. She appears well-developed and well-nourished.   Cardiovascular: Normal rate, regular rhythm and normal heart sounds.    Pulmonary/Chest: Effort normal and breath sounds normal.   Musculoskeletal:        Right wrist: She exhibits decreased range of motion, tenderness, bony tenderness and swelling (mild). She exhibits no effusion, no crepitus, no deformity and no laceration.        Right hand: She exhibits decreased range of motion, tenderness, bony tenderness and swelling. She exhibits normal two-point discrimination, normal capillary refill, no deformity and no laceration. Normal sensation noted. Normal strength noted.   Neurological: She is  alert and oriented to person, place, and time.   Skin: Skin is warm, dry and intact.   Psychiatric: She has a normal mood and affect.       Assessment:       1. Fall (on) (from) unspecified stairs and steps, initial encounter    2. Hand pain, right    3. Wrist pain, acute, unspecified laterality    4. Swelling of finger joint of right hand        Plan:       Lexy was seen today for fall.    Diagnoses and all orders for this visit:    Fall (on) (from) unspecified stairs and steps, initial encounter  -     X-Ray Hand Complete Right; Future    Hand pain, right  -     X-Ray Hand Complete Right; Future    Wrist pain, acute, unspecified laterality  -     X-Ray Hand Complete Right; Future    Swelling of finger joint of right hand  -     X-Ray Hand Complete Right; Future  Home care  · Rest today and resume your normal activities as soon as you are feeling back to normal. It is best to remain with someone who can check on you for the next 24 hours to watch for another episode of falling.  · If you were injured during the fall, follow the advice from your doctor regarding care of your injury.  ·  If you become light-headed or dizzy, lie down immediately or sit and lean forward with your head down.  · As a precaution, do not drive a car or operate dangerous equipment, do not take a bath alone (use a shower instead) and do not swim alone until you see your doctor. A condition causing fainting or seizures must be ruled out before resuming these activities.  · You may use acetaminophen or ibuprofen to control pain, unless another pain medicine was prescribed. If you have chronic liver or kidney disease or ever had a stomach ulcer or gastrointestinal bleeding, talk with your doctor before using these medicines.  · Keep your appointments for any further testing that may have been scheduled for you.  Follow-up care  Follow up with your healthcare provider, or as advised.  If X-rays or CT scan were done, you will be notified if  there is a change in the reading, especially if it affects treatment.  Call 911  Call 911 if any of these occur:  · Trouble breathing  · Confused or difficulty arousing  · Fainting or loss of consciousness  · Rapid or very slow heart rate  · Seizure  · Difficulty with speech or vision, weakness of an arm or leg  · Difficulty walking or talking, loss of balance, numbness or weakness in one side of your body, facial droop  When to seek medical advice  Call your healthcare provider right away if any of these occur:  · Another unexplained fall  · Dizziness  · Severe headache  · Nausea and vomiting  · Blood in vomit, stools (black or red color)  Date Last Reviewed: 11/5/2015  © 2762-6848 [employer]. [address] [place] [address], [place] [address]. All rights reserved. This information is not intended as a substitute for professional medical care. Always follow your healthcare professional's instructions.

## 2017-08-10 NOTE — PATIENT INSTRUCTIONS
Uncertain Causes of Fall  You have had a fall today. But the cause of your fall is not certain. Falls can occur due to slipping, tripping or losing your balance. A fall can also occur from a fainting spell or seizure.  While a fall can happen for a simple reason (tripping over something), falls in elderly people are often caused by a combination of things:  · Age-related decline in function with worsening balance, stability, vision, and muscle strength  · Chronic illness such as heart arrhythmias, heart valve disease, vascular disease, COPD, diabetes, strokes, arthritis  · Effects or side effects of medicines  · Dehydration.  · Environmental hazards such as uneven or slippery ground, unfamiliar place, obstacles, uneven surfaces, or slippery ground  · Situational factors (related to the activity being done, e.g., rushing to the bathroom)  Because the cause of your fall today is not certain, it is possible that a fainting spell or seizure was the cause. This means that it could happen again, without warning. If you fall again, without a cause, then you should return to this facility promptly to have further tests. Otherwise, follow up with your doctor as explained below.  It is normal to feel sore and tight in your muscles and back the next day, and not just the muscles you initially injured. Remember, all the parts of your body are connected, so while initially one area hurts, the next day another may hurt. Also, when you injure yourself, it causes inflammation, which then causes the muscles to tighten up and hurt more. After the initial worsening, it should gradually improve over the next few days. However, more severe pain should be reported.  Even without a definite head injury, you can still get a concussion. Concussions and even bleeding can still occur, especially if you have had a recent injury or take blood thinner medicine. It is not unusual to have a mild headache and feel tired and even nauseous or  dizzy.  Home care  · Rest today and resume your normal activities as soon as you are feeling back to normal. It is best to remain with someone who can check on you for the next 24 hours to watch for another episode of falling.  · If you were injured during the fall, follow the advice from your doctor regarding care of your injury.  ·  If you become light-headed or dizzy, lie down immediately or sit and lean forward with your head down.  · As a precaution, do not drive a car or operate dangerous equipment, do not take a bath alone (use a shower instead) and do not swim alone until you see your doctor. A condition causing fainting or seizures must be ruled out before resuming these activities.  · You may use acetaminophen or ibuprofen to control pain, unless another pain medicine was prescribed. If you have chronic liver or kidney disease or ever had a stomach ulcer or gastrointestinal bleeding, talk with your doctor before using these medicines.  · Keep your appointments for any further testing that may have been scheduled for you.  Follow-up care  Follow up with your healthcare provider, or as advised.  If X-rays or CT scan were done, you will be notified if there is a change in the reading, especially if it affects treatment.  Call 911  Call 911 if any of these occur:  · Trouble breathing  · Confused or difficulty arousing  · Fainting or loss of consciousness  · Rapid or very slow heart rate  · Seizure  · Difficulty with speech or vision, weakness of an arm or leg  · Difficulty walking or talking, loss of balance, numbness or weakness in one side of your body, facial droop  When to seek medical advice  Call your healthcare provider right away if any of these occur:  · Another unexplained fall  · Dizziness  · Severe headache  · Nausea and vomiting  · Blood in vomit, stools (black or red color)  Date Last Reviewed: 11/5/2015  © 3785-7583 [employer]. [address] [place] [address], [place] [address]. All rights  reserved. This information is not intended as a substitute for professional medical care. Always follow your healthcare professional's instructions.

## 2017-10-25 ENCOUNTER — TELEPHONE (OUTPATIENT)
Dept: FAMILY MEDICINE | Facility: CLINIC | Age: 82
End: 2017-10-25

## 2017-10-25 ENCOUNTER — LAB VISIT (OUTPATIENT)
Dept: LAB | Facility: HOSPITAL | Age: 82
End: 2017-10-25
Attending: INTERNAL MEDICINE
Payer: MEDICARE

## 2017-10-25 ENCOUNTER — OFFICE VISIT (OUTPATIENT)
Dept: FAMILY MEDICINE | Facility: CLINIC | Age: 82
End: 2017-10-25
Payer: MEDICARE

## 2017-10-25 VITALS
DIASTOLIC BLOOD PRESSURE: 50 MMHG | WEIGHT: 150.81 LBS | HEART RATE: 65 BPM | HEIGHT: 71 IN | TEMPERATURE: 88 F | SYSTOLIC BLOOD PRESSURE: 116 MMHG | BODY MASS INDEX: 21.11 KG/M2 | OXYGEN SATURATION: 99 %

## 2017-10-25 DIAGNOSIS — I10 ESSENTIAL HYPERTENSION: ICD-10-CM

## 2017-10-25 DIAGNOSIS — D64.9 ANEMIA, UNSPECIFIED TYPE: ICD-10-CM

## 2017-10-25 DIAGNOSIS — E11.22 CONTROLLED TYPE 2 DIABETES MELLITUS WITH STAGE 3 CHRONIC KIDNEY DISEASE, WITHOUT LONG-TERM CURRENT USE OF INSULIN: ICD-10-CM

## 2017-10-25 DIAGNOSIS — N18.30 STAGE 3 CHRONIC KIDNEY DISEASE: ICD-10-CM

## 2017-10-25 DIAGNOSIS — C50.919 MALIGNANT NEOPLASM OF FEMALE BREAST, UNSPECIFIED ESTROGEN RECEPTOR STATUS, UNSPECIFIED LATERALITY, UNSPECIFIED SITE OF BREAST: ICD-10-CM

## 2017-10-25 DIAGNOSIS — I48.20 CHRONIC ATRIAL FIBRILLATION: ICD-10-CM

## 2017-10-25 DIAGNOSIS — Z01.818 PREOPERATIVE EXAMINATION: ICD-10-CM

## 2017-10-25 DIAGNOSIS — E78.5 HYPERLIPIDEMIA, UNSPECIFIED HYPERLIPIDEMIA TYPE: ICD-10-CM

## 2017-10-25 DIAGNOSIS — M25.531 RIGHT WRIST PAIN: Primary | ICD-10-CM

## 2017-10-25 DIAGNOSIS — N18.30 CONTROLLED TYPE 2 DIABETES MELLITUS WITH STAGE 3 CHRONIC KIDNEY DISEASE, WITHOUT LONG-TERM CURRENT USE OF INSULIN: ICD-10-CM

## 2017-10-25 LAB
ALBUMIN SERPL BCP-MCNC: 3.5 G/DL
ALP SERPL-CCNC: 64 U/L
ALT SERPL W/O P-5'-P-CCNC: 13 U/L
ANION GAP SERPL CALC-SCNC: 11 MMOL/L
AST SERPL-CCNC: 21 U/L
BASOPHILS # BLD AUTO: 0.04 K/UL
BASOPHILS NFR BLD: 0.5 %
BILIRUB SERPL-MCNC: 0.6 MG/DL
BUN SERPL-MCNC: 30 MG/DL
CALCIUM SERPL-MCNC: 10.5 MG/DL
CHLORIDE SERPL-SCNC: 96 MMOL/L
CO2 SERPL-SCNC: 33 MMOL/L
CREAT SERPL-MCNC: 1.4 MG/DL
DIFFERENTIAL METHOD: ABNORMAL
EOSINOPHIL # BLD AUTO: 0.1 K/UL
EOSINOPHIL NFR BLD: 1.1 %
ERYTHROCYTE [DISTWIDTH] IN BLOOD BY AUTOMATED COUNT: 17.8 %
ERYTHROCYTE [SEDIMENTATION RATE] IN BLOOD BY WESTERGREN METHOD: 55 MM/HR
EST. GFR  (AFRICAN AMERICAN): 40.6 ML/MIN/1.73 M^2
EST. GFR  (NON AFRICAN AMERICAN): 35.3 ML/MIN/1.73 M^2
ESTIMATED AVG GLUCOSE: 128 MG/DL
GLUCOSE SERPL-MCNC: 129 MG/DL
HBA1C MFR BLD HPLC: 6.1 %
HCT VFR BLD AUTO: 35.5 %
HGB BLD-MCNC: 11 G/DL
IMM GRANULOCYTES # BLD AUTO: 0.04 K/UL
IMM GRANULOCYTES NFR BLD AUTO: 0.5 %
LYMPHOCYTES # BLD AUTO: 1.8 K/UL
LYMPHOCYTES NFR BLD: 23.8 %
MCH RBC QN AUTO: 26.2 PG
MCHC RBC AUTO-ENTMCNC: 31 G/DL
MCV RBC AUTO: 85 FL
MONOCYTES # BLD AUTO: 0.8 K/UL
MONOCYTES NFR BLD: 10.9 %
NEUTROPHILS # BLD AUTO: 4.6 K/UL
NEUTROPHILS NFR BLD: 63.2 %
NRBC BLD-RTO: 0 /100 WBC
PLATELET # BLD AUTO: 243 K/UL
PMV BLD AUTO: 10.8 FL
POTASSIUM SERPL-SCNC: 3.7 MMOL/L
PROT SERPL-MCNC: 7.8 G/DL
RBC # BLD AUTO: 4.2 M/UL
SODIUM SERPL-SCNC: 140 MMOL/L
TSH SERPL DL<=0.005 MIU/L-ACNC: 2.97 UIU/ML
URATE SERPL-MCNC: 5.5 MG/DL
WBC # BLD AUTO: 7.35 K/UL

## 2017-10-25 PROCEDURE — 99999 PR PBB SHADOW E&M-EST. PATIENT-LVL IV: CPT | Mod: PBBFAC,,, | Performed by: INTERNAL MEDICINE

## 2017-10-25 PROCEDURE — 85651 RBC SED RATE NONAUTOMATED: CPT

## 2017-10-25 PROCEDURE — 36415 COLL VENOUS BLD VENIPUNCTURE: CPT | Mod: PO

## 2017-10-25 PROCEDURE — 85025 COMPLETE CBC W/AUTO DIFF WBC: CPT

## 2017-10-25 PROCEDURE — 80053 COMPREHEN METABOLIC PANEL: CPT

## 2017-10-25 PROCEDURE — 84550 ASSAY OF BLOOD/URIC ACID: CPT

## 2017-10-25 PROCEDURE — 84443 ASSAY THYROID STIM HORMONE: CPT

## 2017-10-25 PROCEDURE — 99214 OFFICE O/P EST MOD 30 MIN: CPT | Mod: PBBFAC,PO | Performed by: INTERNAL MEDICINE

## 2017-10-25 PROCEDURE — 99215 OFFICE O/P EST HI 40 MIN: CPT | Mod: S$PBB,,, | Performed by: INTERNAL MEDICINE

## 2017-10-25 PROCEDURE — 83036 HEMOGLOBIN GLYCOSYLATED A1C: CPT

## 2017-10-25 NOTE — TELEPHONE ENCOUNTER
Please call, patient had some recent labs for preop that she may need a copy of could bring to [place]    All the recent labs look stable.  The anemia is the same as always.  Kidney function is stable.  Electrolytes, liver all normal.  A1c sugar test improved from 6.6 down to 6.1.  Thyroid is normal

## 2017-10-25 NOTE — PROGRESS NOTES
Chief complaint follow-up preop and follow-up on diabetes    81-year-old white female  seen in consultation from Dr. hernández in orthopedics although she has Medicare which does not formally cover preop consultation her assessment will be done either way.  She apparently has a preop appointment today with anesthesia at [place].  She's not quite sure she is going to have the surgery on her right hand done under monitored or general anesthesia.  Ever since she fell she has had significant pain and recurrent swelling and redness over the right wrist and right hand.  She does have a history of gout with a normal uric acid level in the past.  She is due for the blood work with her A1c of 6.6 and May.  She is under a low-dose metformin and we discussed that if indeed she has general anesthesia she should hold today before and 48 hours after.  She has a GFR 35 we need to reassess she has had an elevated sedimentation rate.  We discussed that the surgery is a good idea to assess for underlying infectious and/or crystal disease which could be ongoing.  She does appear to have some swelling ganglion cysts over the wrists.  He plans on going on a trip on a train for 15 days and obviously will need the use of her hand with reduced pain.  She has had no prior complications related to anesthesia bleeding or DVT.  She has had a mastectomy on the right which could complicate anesthesia, use of  kitten so forth.  She follows with cardiology patient knows to stop her anticoagulation for 48 hours prior to surgery.  All these issues reviewed and patient counseled and her preop assessment done.Total time over 45 minutes with over 50% counseling.              ROS:   CONST: weight stable. EYES: no vision change. ENT: no sore throat. CV: no chest pain w/ exertion. RESP: no shortness of breath. GI: no nausea, vomiting, diarrhea. No dysphagia. : no urinary issues. MUSCULOSKELETAL: no new myalgias or arthralgias. SKIN: no new  changes. NEURO: no focal deficits. PSYCH: no new issues. ENDOCRINE: no polyuria. HEME: no lymph nodes. ALLERGY: no general pruritis.                                               PAST MEDICAL HISTORY:                                                        1.  Atrial [facility], [facility]                     2.  Breast cancer and right mastectomy, left mastectomy  sees Dr. Altamirano.                                                 3.  Total hysterectomy.                                                      4.  Tonsillectomy.                                                           5.  Appendectomy.                                                            6.  Bilateral knee replacements.                                             7.  Asthma.                                                                  8.  Hypertension.                                                            9.  Hyperlipidemia.                                                          10.  Gout.                                                                   11.  Numerous colonoscopies, all normal. Dr. Kohli, repeats every 5 years                                                  12.  DIABETES with A1c up to 6.6                                       13.  Normal-to-elevated bone density .  14.  Lumbar laminectomy  -Dr Martinez                                                                                                                FAMILY HISTORY:  Father  at 45 of stomach cancer.  Mom  at 73 with   colon cancer, but of natural causes.  Two brothers with colon cancer.  Two   sisters; one with asthma, one  of heart failure.                                                                                                      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  Never smoked, occasional alcohol.  She is          , but  still lives with her two sons.      Gen: no distress  EYES: conjunctiva clear, non-icteric, PERRL  ENT: nose clear, nasal mucosa normal, oropharynx clear and moist, teeth good  NECK:supple, thyroid non-palpable  RESP: effort is good, lungs clear  CV: heart RRR w/o murmur, gallops or rubs; no carotid bruits, no edema  GI: abdomen soft, non-distended, non-tender, no hepatosplenomegaly  MS: gait normal, no clubbing or cyanosis of the digits, the right wrist and hand is somewhat red and swollen and warm.  There does appear to be several ganglion cysts over the dorsal aspect of the wrist  SKIN: no rashes, warm to touch     Lexy was seen today for medication refill.    Diagnoses and all orders for this visit:    Right wrist pain, appears to be some form of inflammatory process and agree with surgical investigation    Preoperative examination, low cardiopulmonary risk and she is cleared for surgery, she has received instructions on holding her anticoagulation, we'll update blood work and it sounds like she may be having other testing such as chest x-ray and EKG done at [facility].  No for clearance for either Velasquez or general given the patient  -     Comprehensive metabolic panel; Future  -     CBC auto differential; Future  -     Hemoglobin A1c; Future  -     Sedimentation rate, manual; Future  -     Uric acid; Future  -     TSH; Future    Chronic atrial fibrillation, issues with anticoagulation as above    Stage 3 chronic kidney disease, reassess  -     Comprehensive metabolic panel; Future  -     Sedimentation rate, manual; Future  -     Uric acid; Future  -     TSH; Future    Controlled type 2 diabetes mellitus with stage 3 chronic kidney disease, without long-term current use of insulin, reassess and instructed on metformin  -     Hemoglobin A1c; Future    Essential hypertension, chronic and stable  -     Comprehensive metabolic panel; Future  -     TSH; Future    Hyperlipidemia, unspecified hyperlipidemia type,  chronic and stable    Malignant neoplasm of female breast, unspecified estrogen receptor status, unspecified laterality, unspecified site of breast, issues related to prior mastectomy, continues on medication    Anemia, unspecified type, reassess  -     CBC auto differential; Future    Other orders  -     Cancel: DXA Bone Density Spine And Hip; Future

## 2017-10-26 NOTE — TELEPHONE ENCOUNTER
----- Message from Kerri Fuentes sent at 10/24/2017  4:43 PM CDT -----  Contact: self  Patient need a doctor release note so patient can have hand surgery. Patient can be reached at [phone].        Thanks,

## 2017-10-26 NOTE — TELEPHONE ENCOUNTER
Spoke with patient and she states that she takes Zaroxolyn 30 minutes prior to taking Demadex    She would like printed prescription to take the [employer]

## 2017-10-26 NOTE — TELEPHONE ENCOUNTER
----- Message from Daya Rojo sent at 10/26/2017  9:12 AM CDT -----  Contact: self  New prescription request for -   metOLazone (ZAROXOLYN) 2.5 MG tablet , #90 . She would like to  a paper prescription .     [phone]   LL

## 2017-10-27 RX ORDER — METOLAZONE 2.5 MG/1
2.5 TABLET ORAL DAILY
Qty: 90 TABLET | Refills: 0 | Status: SHIPPED | OUTPATIENT
Start: 2017-10-27 | End: 2019-03-25

## 2017-10-27 NOTE — TELEPHONE ENCOUNTER
Spoke with [facility] pharmacy and confirmed re: prescription for Zaroxolyn was received     Advised patient of above

## 2017-10-30 ENCOUNTER — TELEPHONE (OUTPATIENT)
Dept: FAMILY MEDICINE | Facility: CLINIC | Age: 82
End: 2017-10-30

## 2017-10-30 NOTE — TELEPHONE ENCOUNTER
----- Message from Denisha amandaheydiangelia sent at 10/27/2017  1:38 PM CDT -----  Contact: Ayleen with [facility]  Rep is asking for lab, and EKG results along with Clearance letter to be sent over.  Fax #    [phone].  Please Attn. Ayleen.     And call back [phone].  Thank you.

## 2017-10-30 NOTE — TELEPHONE ENCOUNTER
Okay to send all the requested records and I do believe I gave patient a note on a prescription stating she was cleared and she was to bring that to her preop appointment    Okay to fax the last clinic note which has a statement of clearance    Regarding EKG and chest x-ray, patient says she was going to have that at [place]

## 2017-12-20 ENCOUNTER — OFFICE VISIT (OUTPATIENT)
Dept: FAMILY MEDICINE | Facility: CLINIC | Age: 82
End: 2017-12-20
Payer: MEDICARE

## 2017-12-20 ENCOUNTER — TELEPHONE (OUTPATIENT)
Dept: FAMILY MEDICINE | Facility: CLINIC | Age: 82
End: 2017-12-20

## 2017-12-20 VITALS
OXYGEN SATURATION: 97 % | HEART RATE: 94 BPM | WEIGHT: 149.94 LBS | TEMPERATURE: 98 F | HEIGHT: 71 IN | SYSTOLIC BLOOD PRESSURE: 118 MMHG | DIASTOLIC BLOOD PRESSURE: 62 MMHG | BODY MASS INDEX: 20.99 KG/M2

## 2017-12-20 DIAGNOSIS — M25.561 ACUTE PAIN OF RIGHT KNEE: ICD-10-CM

## 2017-12-20 DIAGNOSIS — S00.83XD CONTUSION OF FACE, SUBSEQUENT ENCOUNTER: ICD-10-CM

## 2017-12-20 DIAGNOSIS — S01.01XD LACERATION OF SCALP, SUBSEQUENT ENCOUNTER: Primary | ICD-10-CM

## 2017-12-20 PROCEDURE — 99214 OFFICE O/P EST MOD 30 MIN: CPT | Mod: S$PBB,,, | Performed by: NURSE PRACTITIONER

## 2017-12-20 PROCEDURE — 99999 PR PBB SHADOW E&M-EST. PATIENT-LVL III: CPT | Mod: PBBFAC,,, | Performed by: NURSE PRACTITIONER

## 2017-12-20 PROCEDURE — 99213 OFFICE O/P EST LOW 20 MIN: CPT | Mod: PBBFAC,PO | Performed by: NURSE PRACTITIONER

## 2017-12-20 NOTE — TELEPHONE ENCOUNTER
----- Message from Charles Foster sent at 12/18/2017 10:17 AM CST -----  Contact: Self  Pt states she has stiches in her head & needs to see  on Friday, 12/22. Advised pt no apt available & offered to schedule with another physician-patient refused. Pt can be reached @ [phone].

## 2017-12-20 NOTE — PROGRESS NOTES
This dictation has been generated using Dragon Dictation some phonetic errors may occur.     Lexy was seen today for fall.    Diagnoses and all orders for this visit:    Laceration of scalp, subsequent encounter  Comments:  Removed 15 of 30 staples.    Contusion of face, subsequent encounter    Acute pain of right knee  Comments:  History of fall.  Patient reports x-ray completed at emergency room.  We will request the record.      Follow-up on the 26th at 140 for the removal of remaining staples #15 according to patient report.  In excessive of 25 minutes spent with patient with greater than 50% of time dedicated to education on symptoms, diagnosis, treatments, and coordination of care.  Request records from [facility] and if it is obtained and will be reviewed.  No evidence of neurologic problems.    Return if symptoms worsen or fail to improve.      ________________________________________________________________  ________________________________________________________________        Chief Complaint   Patient presents with    Fall     History of present illness  This 82 y.o. presents today for complaint of laceration and staple removal.  Patient reports a fall on the 15th.  She went to [place] emergency room and had 30 staples placed.  Record is not available for my review how her patient reports that they did a CT of her head and x-ray of the knee.  No complications reported.  They advised her to follow-up at the end of this week however she was only able to get an appointment on Thursday.  Patient states she tripped going into the [facility] office in [facility].  Review of systems  No fever or chills  Denies any drainage from the affected area  No confusion.  No headaches.  No behavior changes.    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    Atrial fibrillation  9/7/2012    Blood clotting tendency     Breast cancer     Chronic LBP 4/28/2015    CKD (chronic kidney disease) 6/30/2015    Diabetes mellitus type II, uncontrolled 12/11/2012    Diabetes mellitus with renal manifestations, controlled     Family history of colon cancer 10/7/2015    Fever blister     HTN (hypertension) 9/7/2012    Hyperlipemia     Hyperlipidemia 12/11/2012    Intrinsic asthma, unspecified 12/11/2012    Keloid cicatrix     Pacemaker     SQUAMOUS CELL CARCINOMA 7/2013    right dorsal hand       Past Surgical History:   Procedure Laterality Date    CERVICAL DISC SURGERY  1990's    C5 & C6    KNEE SURGERY  1-    bilateral TKR    LUMBAR LAMINECTOMY      steck 2014    MASTECTOMY Right     2 yrs later had left side done       Family History   Problem Relation Age of Onset    Melanoma Neg Hx     Eczema Neg Hx     Psoriasis Neg Hx        Social History     Social History    Marital status:      Spouse name: N/A    Number of children: N/A    Years of education: N/A     Occupational History    Retired      Social History Main Topics    Smoking status: Never Smoker    Smokeless tobacco: Never Used    Alcohol use No    Drug use: No    Sexual activity: Not Asked     Other Topics Concern    Are You Pregnant Or Think You May Be? No    Breast-Feeding No     Social History Narrative    None       Current Outpatient Prescriptions   Medication Sig Dispense Refill    allopurinol (ZYLOPRIM) 300 MG tablet Take 1 tablet (300 mg total) by mouth once daily. 1 Tablet Oral Every day 90 tablet 12    dabigatran etexilate (PRADAXA) 150 mg Cap Take 1 capsule (150 mg total) by mouth 2 (two) times daily. 180 capsule 12    desonide 0.05% (DESOWEN) 0.05 % Oint AAA face bid 60 g 3    digoxin (LANOXIN) 250 mcg tablet Take 1 tablet (250 mcg total) by mouth once daily. 1 Tablet Oral Every day 100 tablet 12    fluticasone-salmeterol 250-50 mcg/dose (ADVAIR DISKUS) 250-50 mcg/dose diskus  inhaler Inhale 1 puff into the lungs 2 (two) times daily. 1 Disk with Device Inhalation Twice a day 180 each 12    gabapentin (NEURONTIN) 100 MG capsule 2  capsule 12    latanoprost 0.005 % ophthalmic solution   6    lidocaine (LIDODERM) 5 % Place 1 patch onto the skin once daily. Remove & Discard patch within 12 hours or as directed by MD Rodriguez patch 12    losartan (COZAAR) 50 MG tablet Take 1 tablet (50 mg total) by mouth once daily. 90 tablet 90    metformin (GLUCOPHAGE-XR) 500 MG 24 hr tablet Take 1 tablet (500 mg total) by mouth once daily. 180 tablet 3    metOLazone (ZAROXOLYN) 2.5 MG tablet Take 1 tablet (2.5 mg total) by mouth once daily. 90 tablet 0    multivitamin capsule Take 1 capsule by mouth once daily.      sertraline (ZOLOFT) 50 MG tablet Take 50 mg by mouth once daily.      simvastatin (ZOCOR) 20 MG tablet Take 1 tablet (20 mg total) by mouth every evening. 90 tablet 12    theophylline (THEODUR) 300 MG 12 hr tablet Take 1 tablet (300 mg total) by mouth every 12 (twelve) hours. 1 Tablet Sustained Release 12HR Oral Twice a day 180 tablet 12    torsemide (DEMADEX) 20 MG Tab Take 1 tablet (20 mg total) by mouth once daily. Take 1 tablet every morning/everyday. 90 tablet 12    tramadol (ULTRAM) 50 mg tablet 1-2 q 6hr prn 180 tablet 5    VIT C/VIT E/LUTEIN/MIN/OMEGA-3 (OCUVITE ORAL) Take by mouth.       No current facility-administered medications for this visit.        Review of patient's allergies indicates:   Allergen Reactions    Antihistimine Other (See Comments)    Antivert  [meclizine] Rash       Physical examination  Vitals Reviewed  Gen. Well-dressed well-nourished no apparent distress  Skin warm dry and intact.  No rashes noted.  Laceration with staples in place noted to the scalp.  It is a half moon laceration noted greater than 6 inches with approximately 30 staples in place.  It extends from the frontal portion of the skull under the hairline across to the other side of the  skull and back towards the occipital region but doesn't reach the occipital region.  No redness, ecchymosis, or drainage observed.  Remarkable bruising noted of the scalp face even down into the neck and upper shoulders.  She also has bruising on the backs of hand.  She has bruising on the right knee.  Neuro. Awake alert oriented x4.  Normal judgment and cognition noted.  Extremities no clubbing cyanosis or edema noted.     Call or return to clinic prn if these symptoms worsen or fail to improve as anticipated.

## 2017-12-26 ENCOUNTER — OFFICE VISIT (OUTPATIENT)
Dept: FAMILY MEDICINE | Facility: CLINIC | Age: 82
End: 2017-12-26
Payer: MEDICARE

## 2017-12-26 VITALS
WEIGHT: 146.63 LBS | DIASTOLIC BLOOD PRESSURE: 50 MMHG | HEIGHT: 72 IN | HEART RATE: 88 BPM | SYSTOLIC BLOOD PRESSURE: 120 MMHG | BODY MASS INDEX: 19.86 KG/M2 | OXYGEN SATURATION: 96 % | TEMPERATURE: 98 F

## 2017-12-26 DIAGNOSIS — S01.01XD LACERATION OF SCALP, SUBSEQUENT ENCOUNTER: Primary | ICD-10-CM

## 2017-12-26 PROCEDURE — 99213 OFFICE O/P EST LOW 20 MIN: CPT | Mod: PBBFAC,PO | Performed by: NURSE PRACTITIONER

## 2017-12-26 PROCEDURE — 99212 OFFICE O/P EST SF 10 MIN: CPT | Mod: S$PBB,,, | Performed by: NURSE PRACTITIONER

## 2017-12-26 PROCEDURE — 99999 PR PBB SHADOW E&M-EST. PATIENT-LVL III: CPT | Mod: PBBFAC,,, | Performed by: NURSE PRACTITIONER

## 2017-12-26 NOTE — PROGRESS NOTES
This dictation has been generated using Dragon Dictation some phonetic errors may occur.     Lexy was seen today for suture / staple removal.    Diagnoses and all orders for this visit:    Laceration of scalp, subsequent encounter      Follow-up on the 26th at 140 for the removal of remaining staples #14 according to patient report. Unable to find the 30th staple. It appears that it was lost. She will monitor and return as needed.     No evidence of neurologic problems.    No Follow-up on file.      ________________________________________________________________  ________________________________________________________________        Chief Complaint   Patient presents with    Suture / Staple Removal     History of present illness  This 82 y.o. presents today for complaint of laceration and staple removal.  Patient reports a fall on the 15th.  She went to [facility] room and had 30 staples placed.  Record is not available for my review how her patient reports that they did a CT of her head and x-ray of the knee.  No complications reported.  They advised her to follow-up at the end of this week however she was only able to get an appointment on Thursday.  Patient states she tripped going into the [facility] office in [facility].  Here today with Son Delroy. No complications from wound. No infection.     Review of systems  No fever or chills  Denies any drainage from the affected area  No confusion.  No headaches.  No behavior changes.    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    Atrial fibrillation 9/7/2012    Blood clotting tendency     Breast cancer     Chronic LBP 4/28/2015    CKD (chronic kidney disease) 6/30/2015    Diabetes mellitus type II, uncontrolled 12/11/2012    Diabetes mellitus with renal manifestations, controlled     Family history of colon cancer 10/7/2015     Fever blister     HTN (hypertension) 9/7/2012    Hyperlipemia     Hyperlipidemia 12/11/2012    Intrinsic asthma, unspecified 12/11/2012    Keloid cicatrix     Pacemaker     SQUAMOUS CELL CARCINOMA 7/2013    right dorsal hand       Past Surgical History:   Procedure Laterality Date    CERVICAL DISC SURGERY  1990's    C5 & C6    KNEE SURGERY  1-    bilateral TKR    LUMBAR LAMINECTOMY      steck 2014    MASTECTOMY Right     2 yrs later had left side done       Family History   Problem Relation Age of Onset    Melanoma Neg Hx     Eczema Neg Hx     Psoriasis Neg Hx        Social History     Social History    Marital status:      Spouse name: N/A    Number of children: N/A    Years of education: N/A     Occupational History    Retired      Social History Main Topics    Smoking status: Never Smoker    Smokeless tobacco: Never Used    Alcohol use No    Drug use: No    Sexual activity: Not Asked     Other Topics Concern    Are You Pregnant Or Think You May Be? No    Breast-Feeding No     Social History Narrative    None       Current Outpatient Prescriptions   Medication Sig Dispense Refill    allopurinol (ZYLOPRIM) 300 MG tablet Take 1 tablet (300 mg total) by mouth once daily. 1 Tablet Oral Every day 90 tablet 12    dabigatran etexilate (PRADAXA) 150 mg Cap Take 1 capsule (150 mg total) by mouth 2 (two) times daily. 180 capsule 12    desonide 0.05% (DESOWEN) 0.05 % Oint AAA face bid 60 g 3    digoxin (LANOXIN) 250 mcg tablet Take 1 tablet (250 mcg total) by mouth once daily. 1 Tablet Oral Every day 100 tablet 12    fluticasone-salmeterol 250-50 mcg/dose (ADVAIR DISKUS) 250-50 mcg/dose diskus inhaler Inhale 1 puff into the lungs 2 (two) times daily. 1 Disk with Device Inhalation Twice a day 180 each 12    gabapentin (NEURONTIN) 100 MG capsule 2  capsule 12    latanoprost 0.005 % ophthalmic solution   6    lidocaine (LIDODERM) 5 % Place 1 patch onto the skin once  daily. Remove & Discard patch within 12 hours or as directed by MD 30 patch 12    losartan (COZAAR) 50 MG tablet Take 1 tablet (50 mg total) by mouth once daily. 90 tablet 90    metformin (GLUCOPHAGE-XR) 500 MG 24 hr tablet Take 1 tablet (500 mg total) by mouth once daily. 180 tablet 3    metOLazone (ZAROXOLYN) 2.5 MG tablet Take 1 tablet (2.5 mg total) by mouth once daily. 90 tablet 0    multivitamin capsule Take 1 capsule by mouth once daily.      sertraline (ZOLOFT) 50 MG tablet Take 50 mg by mouth once daily.      simvastatin (ZOCOR) 20 MG tablet Take 1 tablet (20 mg total) by mouth every evening. 90 tablet 12    theophylline (THEODUR) 300 MG 12 hr tablet Take 1 tablet (300 mg total) by mouth every 12 (twelve) hours. 1 Tablet Sustained Release 12HR Oral Twice a day 180 tablet 12    torsemide (DEMADEX) 20 MG Tab Take 1 tablet (20 mg total) by mouth once daily. Take 1 tablet every morning/everyday. 90 tablet 12    tramadol (ULTRAM) 50 mg tablet 1-2 q 6hr prn 180 tablet 5    VIT C/VIT E/LUTEIN/MIN/OMEGA-3 (OCUVITE ORAL) Take by mouth.       No current facility-administered medications for this visit.        Review of patient's allergies indicates:   Allergen Reactions    Antihistimine Other (See Comments)    Antivert  [meclizine] Rash       Physical examination  Vitals Reviewed  Gen. Well-dressed well-nourished no apparent distress  Skin warm dry and intact.  No rashes noted.  Laceration with staples in place noted to the scalp.  It is a half moon laceration noted greater than 6 inches with approximately 30 staples in place.  It extends from the frontal portion of the skull under the hairline across to the other side of the skull and back towards the occipital region but doesn't reach the occipital region.  No redness, ecchymosis, or drainage observed.  Remarkable bruising noted of the scalp face even down into the neck and upper shoulders.  She also has bruising on the backs of hand.  She has bruising  on the right knee. Bruising improved.   Neuro. Awake alert oriented x4.  Normal judgment and cognition noted.  Extremities no clubbing cyanosis or edema noted.     Call or return to clinic prn if these symptoms worsen or fail to improve as anticipated.

## 2017-12-27 ENCOUNTER — OFFICE VISIT (OUTPATIENT)
Dept: FAMILY MEDICINE | Facility: CLINIC | Age: 82
End: 2017-12-27
Payer: MEDICARE

## 2017-12-27 DIAGNOSIS — S01.01XD LACERATION OF SCALP, SUBSEQUENT ENCOUNTER: Primary | ICD-10-CM

## 2017-12-27 PROCEDURE — 99499 UNLISTED E&M SERVICE: CPT | Mod: S$PBB,,, | Performed by: NURSE PRACTITIONER

## 2017-12-27 NOTE — PROGRESS NOTES
This dictation has been generated using Dragon Dictation some phonetic errors may occur.     Diagnoses and all orders for this visit:    Laceration of scalp, subsequent encounter      Unable to ID any staple. Scab and hard tissue removed with scissors.     No Follow-up on file.      ________________________________________________________________  ________________________________________________________________        No chief complaint on file.    History of present illness  This 82 y.o. presents today for complaint of staple removal.  Pt noted hard area on the laceration and thinks it may be the final staple.   No drainage or infectious symptoms.  No fever.     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    Atrial fibrillation 9/7/2012    Blood clotting tendency     Breast cancer     Chronic LBP 4/28/2015    CKD (chronic kidney disease) 6/30/2015    Diabetes mellitus type II, uncontrolled 12/11/2012    Diabetes mellitus with renal manifestations, controlled     Family history of colon cancer 10/7/2015    Fever blister     HTN (hypertension) 9/7/2012    Hyperlipemia     Hyperlipidemia 12/11/2012    Intrinsic asthma, unspecified 12/11/2012    Keloid cicatrix     Pacemaker     SQUAMOUS CELL CARCINOMA 7/2013    right dorsal hand       Past Surgical History:   Procedure Laterality Date    CERVICAL DISC SURGERY  1990's    C5 & C6    KNEE SURGERY  1-    bilateral TKR    LUMBAR LAMINECTOMY      [place] 2014    MASTECTOMY Right     2 yrs later had left side done       Family History   Problem Relation Age of Onset    Melanoma Neg Hx     Eczema Neg Hx     Psoriasis Neg Hx        Social History     Social History    Marital status:      Spouse name: N/A    Number of children: N/A    Years of education: N/A     Occupational History    Retired      Social  History Main Topics    Smoking status: Never Smoker    Smokeless tobacco: Never Used    Alcohol use No    Drug use: No    Sexual activity: Not on file     Other Topics Concern    Are You Pregnant Or Think You May Be? No    Breast-Feeding No     Social History Narrative    No narrative on file       Current Outpatient Prescriptions   Medication Sig Dispense Refill    allopurinol (ZYLOPRIM) 300 MG tablet Take 1 tablet (300 mg total) by mouth once daily. 1 Tablet Oral Every day 90 tablet 12    dabigatran etexilate (PRADAXA) 150 mg Cap Take 1 capsule (150 mg total) by mouth 2 (two) times daily. 180 capsule 12    desonide 0.05% (DESOWEN) 0.05 % Oint AAA face bid 60 g 3    digoxin (LANOXIN) 250 mcg tablet Take 1 tablet (250 mcg total) by mouth once daily. 1 Tablet Oral Every day 100 tablet 12    fluticasone-salmeterol 250-50 mcg/dose (ADVAIR DISKUS) 250-50 mcg/dose diskus inhaler Inhale 1 puff into the lungs 2 (two) times daily. 1 Disk with Device Inhalation Twice a day 180 each 12    gabapentin (NEURONTIN) 100 MG capsule 2  capsule 12    latanoprost 0.005 % ophthalmic solution   6    lidocaine (LIDODERM) 5 % Place 1 patch onto the skin once daily. Remove & Discard patch within 12 hours or as directed by MD 30 patch 12    losartan (COZAAR) 50 MG tablet Take 1 tablet (50 mg total) by mouth once daily. 90 tablet 90    metformin (GLUCOPHAGE-XR) 500 MG 24 hr tablet Take 1 tablet (500 mg total) by mouth once daily. 180 tablet 3    metOLazone (ZAROXOLYN) 2.5 MG tablet Take 1 tablet (2.5 mg total) by mouth once daily. 90 tablet 0    multivitamin capsule Take 1 capsule by mouth once daily.      sertraline (ZOLOFT) 50 MG tablet Take 50 mg by mouth once daily.      simvastatin (ZOCOR) 20 MG tablet Take 1 tablet (20 mg total) by mouth every evening. 90 tablet 12    theophylline (THEODUR) 300 MG 12 hr tablet Take 1 tablet (300 mg total) by mouth every 12 (twelve) hours. 1 Tablet Sustained Release 12HR  Oral Twice a day 180 tablet 12    torsemide (DEMADEX) 20 MG Tab Take 1 tablet (20 mg total) by mouth once daily. Take 1 tablet every morning/everyday. 90 tablet 12    tramadol (ULTRAM) 50 mg tablet 1-2 q 6hr prn 180 tablet 5    VIT C/VIT E/LUTEIN/MIN/OMEGA-3 (OCUVITE ORAL) Take by mouth.       No current facility-administered medications for this visit.        Review of patient's allergies indicates:   Allergen Reactions    Antihistimine Other (See Comments)    Antivert  [meclizine] Rash       Physical examination  Vitals Reviewed  Gen. Well-dressed well-nourished no apparent distress  Skin skin intact. REEDA check WNL.   HEENT.  TM intact bilateral with normal light reflex.  No mastoid tenderness during percussion.  Nares patent bilateral.  Pharynx is unremarkable.  No maxillary or frontal sinus tenderness when percussed.    Neck is supple without adenopathy  Neuro. Awake alert oriented x4.  Normal judgment and cognition noted.  Extremities no clubbing cyanosis or edema noted.     Call or return to clinic prn if these symptoms worsen or fail to improve as anticipated.

## 2018-01-15 ENCOUNTER — TELEPHONE (OUTPATIENT)
Dept: FAMILY MEDICINE | Facility: CLINIC | Age: 83
End: 2018-01-15

## 2018-01-15 DIAGNOSIS — N18.30 CONTROLLED TYPE 2 DIABETES MELLITUS WITH STAGE 3 CHRONIC KIDNEY DISEASE, WITHOUT LONG-TERM CURRENT USE OF INSULIN: ICD-10-CM

## 2018-01-15 DIAGNOSIS — E11.22 CONTROLLED TYPE 2 DIABETES MELLITUS WITH STAGE 3 CHRONIC KIDNEY DISEASE, WITHOUT LONG-TERM CURRENT USE OF INSULIN: ICD-10-CM

## 2018-01-15 DIAGNOSIS — N18.30 STAGE 3 CHRONIC KIDNEY DISEASE: Primary | ICD-10-CM

## 2018-01-15 NOTE — TELEPHONE ENCOUNTER
----- Message from Francisco Hagan sent at 1/15/2018 11:25 AM CST -----  Contact: [phone]/PT  Calling TOspeak with nurse TO confirm if lab orders are need prior TO appt

## 2018-01-24 ENCOUNTER — LAB VISIT (OUTPATIENT)
Dept: LAB | Facility: HOSPITAL | Age: 83
End: 2018-01-24
Attending: INTERNAL MEDICINE
Payer: MEDICARE

## 2018-01-24 DIAGNOSIS — N18.30 STAGE 3 CHRONIC KIDNEY DISEASE: ICD-10-CM

## 2018-01-24 DIAGNOSIS — N18.30 CONTROLLED TYPE 2 DIABETES MELLITUS WITH STAGE 3 CHRONIC KIDNEY DISEASE, WITHOUT LONG-TERM CURRENT USE OF INSULIN: ICD-10-CM

## 2018-01-24 DIAGNOSIS — E11.22 CONTROLLED TYPE 2 DIABETES MELLITUS WITH STAGE 3 CHRONIC KIDNEY DISEASE, WITHOUT LONG-TERM CURRENT USE OF INSULIN: ICD-10-CM

## 2018-01-24 DIAGNOSIS — E11.9 TYPE 2 DIABETES MELLITUS WITHOUT COMPLICATION: ICD-10-CM

## 2018-01-24 LAB
ALBUMIN SERPL BCP-MCNC: 3.5 G/DL
ALP SERPL-CCNC: 63 U/L
ALT SERPL W/O P-5'-P-CCNC: 7 U/L
ANION GAP SERPL CALC-SCNC: 9 MMOL/L
AST SERPL-CCNC: 18 U/L
BASOPHILS # BLD AUTO: 0.06 K/UL
BASOPHILS NFR BLD: 0.9 %
BILIRUB SERPL-MCNC: 0.5 MG/DL
BUN SERPL-MCNC: 18 MG/DL
CALCIUM SERPL-MCNC: 9.9 MG/DL
CHLORIDE SERPL-SCNC: 100 MMOL/L
CHOLEST SERPL-MCNC: 128 MG/DL
CHOLEST SERPL-MCNC: 128 MG/DL
CHOLEST/HDLC SERPL: 2.2 {RATIO}
CHOLEST/HDLC SERPL: 2.2 {RATIO}
CO2 SERPL-SCNC: 31 MMOL/L
CREAT SERPL-MCNC: 1.3 MG/DL
DIFFERENTIAL METHOD: ABNORMAL
EOSINOPHIL # BLD AUTO: 0.1 K/UL
EOSINOPHIL NFR BLD: 1.8 %
ERYTHROCYTE [DISTWIDTH] IN BLOOD BY AUTOMATED COUNT: 17.7 %
EST. GFR  (AFRICAN AMERICAN): 44.1 ML/MIN/1.73 M^2
EST. GFR  (NON AFRICAN AMERICAN): 38.3 ML/MIN/1.73 M^2
ESTIMATED AVG GLUCOSE: 120 MG/DL
GLUCOSE SERPL-MCNC: 99 MG/DL
HBA1C MFR BLD HPLC: 5.8 %
HCT VFR BLD AUTO: 33.1 %
HDLC SERPL-MCNC: 59 MG/DL
HDLC SERPL-MCNC: 59 MG/DL
HDLC SERPL: 46.1 %
HDLC SERPL: 46.1 %
HGB BLD-MCNC: 9.9 G/DL
IMM GRANULOCYTES # BLD AUTO: 0.02 K/UL
IMM GRANULOCYTES NFR BLD AUTO: 0.3 %
LDLC SERPL CALC-MCNC: 52.8 MG/DL
LDLC SERPL CALC-MCNC: 52.8 MG/DL
LYMPHOCYTES # BLD AUTO: 1.6 K/UL
LYMPHOCYTES NFR BLD: 24.2 %
MCH RBC QN AUTO: 26.1 PG
MCHC RBC AUTO-ENTMCNC: 29.9 G/DL
MCV RBC AUTO: 87 FL
MONOCYTES # BLD AUTO: 0.7 K/UL
MONOCYTES NFR BLD: 9.8 %
NEUTROPHILS # BLD AUTO: 4.2 K/UL
NEUTROPHILS NFR BLD: 63 %
NONHDLC SERPL-MCNC: 69 MG/DL
NONHDLC SERPL-MCNC: 69 MG/DL
NRBC BLD-RTO: 0 /100 WBC
PLATELET # BLD AUTO: 318 K/UL
PMV BLD AUTO: 10.7 FL
POTASSIUM SERPL-SCNC: 4.3 MMOL/L
PROT SERPL-MCNC: 7.3 G/DL
RBC # BLD AUTO: 3.8 M/UL
SODIUM SERPL-SCNC: 140 MMOL/L
TRIGL SERPL-MCNC: 81 MG/DL
TRIGL SERPL-MCNC: 81 MG/DL
TSH SERPL DL<=0.005 MIU/L-ACNC: 3.05 UIU/ML
WBC # BLD AUTO: 6.65 K/UL

## 2018-01-24 PROCEDURE — 80061 LIPID PANEL: CPT

## 2018-01-24 PROCEDURE — 83036 HEMOGLOBIN GLYCOSYLATED A1C: CPT

## 2018-01-24 PROCEDURE — 36415 COLL VENOUS BLD VENIPUNCTURE: CPT | Mod: PO

## 2018-01-24 PROCEDURE — 84443 ASSAY THYROID STIM HORMONE: CPT

## 2018-01-24 PROCEDURE — 80053 COMPREHEN METABOLIC PANEL: CPT

## 2018-01-24 PROCEDURE — 85025 COMPLETE CBC W/AUTO DIFF WBC: CPT

## 2018-01-29 ENCOUNTER — OFFICE VISIT (OUTPATIENT)
Dept: FAMILY MEDICINE | Facility: CLINIC | Age: 83
End: 2018-01-29
Payer: MEDICARE

## 2018-01-29 VITALS
HEART RATE: 90 BPM | BODY MASS INDEX: 20.83 KG/M2 | SYSTOLIC BLOOD PRESSURE: 120 MMHG | DIASTOLIC BLOOD PRESSURE: 60 MMHG | TEMPERATURE: 98 F | WEIGHT: 148.81 LBS | OXYGEN SATURATION: 97 % | HEIGHT: 71 IN

## 2018-01-29 DIAGNOSIS — C50.919 MALIGNANT NEOPLASM OF FEMALE BREAST, UNSPECIFIED ESTROGEN RECEPTOR STATUS, UNSPECIFIED LATERALITY, UNSPECIFIED SITE OF BREAST: ICD-10-CM

## 2018-01-29 DIAGNOSIS — I10 ESSENTIAL HYPERTENSION: ICD-10-CM

## 2018-01-29 DIAGNOSIS — M54.5 CHRONIC BILATERAL LOW BACK PAIN, WITH SCIATICA PRESENCE UNSPECIFIED: ICD-10-CM

## 2018-01-29 DIAGNOSIS — E11.22 CONTROLLED TYPE 2 DIABETES MELLITUS WITH STAGE 3 CHRONIC KIDNEY DISEASE, WITHOUT LONG-TERM CURRENT USE OF INSULIN: ICD-10-CM

## 2018-01-29 DIAGNOSIS — N18.30 CONTROLLED TYPE 2 DIABETES MELLITUS WITH STAGE 3 CHRONIC KIDNEY DISEASE, WITHOUT LONG-TERM CURRENT USE OF INSULIN: ICD-10-CM

## 2018-01-29 DIAGNOSIS — D68.9 COAGULOPATHY: ICD-10-CM

## 2018-01-29 DIAGNOSIS — I48.20 CHRONIC ATRIAL FIBRILLATION: ICD-10-CM

## 2018-01-29 DIAGNOSIS — G89.29 CHRONIC BILATERAL LOW BACK PAIN, WITH SCIATICA PRESENCE UNSPECIFIED: ICD-10-CM

## 2018-01-29 DIAGNOSIS — D64.9 ANEMIA, UNSPECIFIED TYPE: Primary | ICD-10-CM

## 2018-01-29 DIAGNOSIS — F39 MOOD DISORDER: ICD-10-CM

## 2018-01-29 DIAGNOSIS — E78.5 HYPERLIPIDEMIA, UNSPECIFIED HYPERLIPIDEMIA TYPE: ICD-10-CM

## 2018-01-29 DIAGNOSIS — I27.20 PULMONARY HYPERTENSION: ICD-10-CM

## 2018-01-29 PROCEDURE — 99213 OFFICE O/P EST LOW 20 MIN: CPT | Mod: PBBFAC,PO | Performed by: INTERNAL MEDICINE

## 2018-01-29 PROCEDURE — 99214 OFFICE O/P EST MOD 30 MIN: CPT | Mod: S$PBB,,, | Performed by: INTERNAL MEDICINE

## 2018-01-29 PROCEDURE — 99999 PR PBB SHADOW E&M-EST. PATIENT-LVL III: CPT | Mod: PBBFAC,,, | Performed by: INTERNAL MEDICINE

## 2018-01-29 RX ORDER — TORSEMIDE 20 MG/1
20 TABLET ORAL DAILY
Qty: 90 TABLET | Refills: 12 | Status: SHIPPED | OUTPATIENT
Start: 2018-01-29 | End: 2019-02-05 | Stop reason: SDUPTHER

## 2018-01-29 RX ORDER — DIGOXIN 250 MCG
250 TABLET ORAL DAILY
Qty: 100 TABLET | Refills: 12 | Status: SHIPPED | OUTPATIENT
Start: 2018-01-29 | End: 2019-02-05

## 2018-01-29 RX ORDER — SIMVASTATIN 20 MG/1
20 TABLET, FILM COATED ORAL NIGHTLY
Qty: 90 TABLET | Refills: 12 | Status: SHIPPED | OUTPATIENT
Start: 2018-01-29 | End: 2019-02-05 | Stop reason: SDUPTHER

## 2018-01-29 RX ORDER — LOSARTAN POTASSIUM 50 MG/1
50 TABLET ORAL DAILY
Qty: 90 TABLET | Refills: 90 | Status: SHIPPED | OUTPATIENT
Start: 2018-01-29 | End: 2018-06-19

## 2018-01-29 RX ORDER — DABIGATRAN ETEXILATE 150 MG/1
150 CAPSULE ORAL 2 TIMES DAILY
Qty: 180 CAPSULE | Refills: 12 | Status: SHIPPED | OUTPATIENT
Start: 2018-01-29 | End: 2019-02-05 | Stop reason: SDUPTHER

## 2018-01-29 RX ORDER — TRAMADOL HYDROCHLORIDE 50 MG/1
TABLET ORAL
Qty: 180 TABLET | Refills: 5 | Status: SHIPPED | OUTPATIENT
Start: 2018-01-29 | End: 2018-09-18 | Stop reason: SDUPTHER

## 2018-01-29 RX ORDER — ALLOPURINOL 300 MG/1
300 TABLET ORAL DAILY
Qty: 90 TABLET | Refills: 12 | Status: SHIPPED | OUTPATIENT
Start: 2018-01-29 | End: 2019-02-05 | Stop reason: SDUPTHER

## 2018-01-29 RX ORDER — METFORMIN HYDROCHLORIDE 500 MG/1
500 TABLET, EXTENDED RELEASE ORAL DAILY
Qty: 180 TABLET | Refills: 3 | Status: SHIPPED | OUTPATIENT
Start: 2018-01-29 | End: 2019-02-05

## 2018-01-29 RX ORDER — SERTRALINE HYDROCHLORIDE 50 MG/1
50 TABLET, FILM COATED ORAL DAILY
Qty: 90 TABLET | Refills: 12 | Status: SHIPPED | OUTPATIENT
Start: 2018-01-29 | End: 2019-02-05 | Stop reason: SDUPTHER

## 2018-01-29 RX ORDER — GABAPENTIN 100 MG/1
CAPSULE ORAL
Qty: 180 CAPSULE | Refills: 12 | Status: SHIPPED | OUTPATIENT
Start: 2018-01-29 | End: 2018-09-18 | Stop reason: SDUPTHER

## 2018-01-29 RX ORDER — FLUTICASONE PROPIONATE AND SALMETEROL 250; 50 UG/1; UG/1
1 POWDER RESPIRATORY (INHALATION) 2 TIMES DAILY
Qty: 180 EACH | Refills: 12 | Status: SHIPPED | OUTPATIENT
Start: 2018-01-29 | End: 2019-02-05 | Stop reason: SDUPTHER

## 2018-01-29 NOTE — PROGRESS NOTES
Chief complaint follow-up on diabetes    82-year-old white female here to follow-up on blood work.  She does have a chronic anemia and recent hemoglobin of 9.9 previously 11 but it is been 10 before.  No clinical bleeding although in December she did have a scalp laceration and had some bleeding.  She is on an anticoagulant.  I gave her written instructions that I would like to recheck this in 3 months, April.  Creatinine actually better going down to 1.3 and she's been drinking more water.  Blood pressures under good control.  She needs numerous medications refilled and printed so that she can go to the  base.  Lipids are excellent.  She apparently also was given Viagra for her lung condition presumably having pulmonary hypertension.  She sees outside pulmonary.  She plans to follow-up with her oncologist who is now joint [facility] for her history of breast cancer.  She has chronic low back pain for which she occasionally takes tramadol and needs a refill.  All these issues reviewed and patient counseled and we went over her medications and labs in detail today together.Total time over 25 minutes with over 50% counseling.            ROS:   CONST: weight stable. EYES: no vision change. ENT: no sore throat. CV: no chest pain w/ exertion. RESP: no shortness of breath. GI: no nausea, vomiting, diarrhea. No dysphagia. : no urinary issues. MUSCULOSKELETAL: no new myalgias or arthralgias. SKIN: no new changes. NEURO: no focal deficits. PSYCH: no new issues. ENDOCRINE: no polyuria. HEME: no lymph nodes. ALLERGY: no general pruritis.                                               PAST MEDICAL HISTORY:                                                        1.  Atrial fibrillation, [facility]                     2.  Breast cancer and right mastectomy, left mastectomy 5/12 sees Dr. Altamirano.                                                 3.  Total hysterectomy.                                                       4.  Tonsillectomy.                                                           5.  Appendectomy.                                                            6.  Bilateral knee replacements.                                             7.  Asthma.                                                                  8.  Hypertension.                                                            9.  Hyperlipidemia.                                                          10.  Gout.                                                                   11.  Numerous colonoscopies, all normal. Dr. Kohli, repeats every 5 years                                                  12.  DIABETES with A1c up to 6.6                                       13.  Normal-to-elevated bone density .  14.  Lumbar laminectomy  -Dr Martinez                                                                                                                FAMILY HISTORY:  Father  at 45 of stomach cancer.  Mom  at 73 with   colon cancer, but of natural causes.  Two brothers with colon cancer.  Two   sisters; one with asthma, one  of heart failure.                                                                                                      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  Never smoked, occasional alcohol.  She is          , but still lives with her two sons.      Gen: no distress  Exam otherwise deferred    Lexy was seen today for annual exam.    Diagnoses and all orders for this visit:    Anemia, unspecified type, likely chronic been on anticoagulants for recheck in 3 months    Chronic atrial fibrillation    Controlled type 2 diabetes mellitus with stage 3 chronic kidney disease, without long-term current use of insulin, A1c still controlled, follow-up every 6 months    Malignant neoplasm of female breast, unspecified estrogen receptor  status, unspecified laterality, unspecified site of breast, follows with oncology    Hyperlipidemia, unspecified hyperlipidemia type, chronic and stable    Essential hypertension, chronic and stable    Coagulopathy, on anticoagulant    Pulmonary hypertension, apparently a new diagnosis for which she was given the prescription Viagra but we do not have that on her list yet    Mood disorder, chronic and stable    Chronic bilateral low back pain, with sciatica presence unspecified, controlled substance management and prescription writing done    Other orders  -     allopurinol (ZYLOPRIM) 300 MG tablet; Take 1 tablet (300 mg total) by mouth once daily. 1 Tablet Oral Every day  -     dabigatran etexilate (PRADAXA) 150 mg Cap; Take 1 capsule (150 mg total) by mouth 2 (two) times daily.  -     digoxin (LANOXIN) 250 mcg tablet; Take 1 tablet (250 mcg total) by mouth once daily. 1 Tablet Oral Every day  -     fluticasone-salmeterol 250-50 mcg/dose (ADVAIR DISKUS) 250-50 mcg/dose diskus inhaler; Inhale 1 puff into the lungs 2 (two) times daily. 1 Disk with Device Inhalation Twice a day  -     gabapentin (NEURONTIN) 100 MG capsule; 2 HS  -     losartan (COZAAR) 50 MG tablet; Take 1 tablet (50 mg total) by mouth once daily.  -     metFORMIN (GLUCOPHAGE-XR) 500 MG 24 hr tablet; Take 1 tablet (500 mg total) by mouth once daily.  -     sertraline (ZOLOFT) 50 MG tablet; Take 1 tablet (50 mg total) by mouth once daily.  -     simvastatin (ZOCOR) 20 MG tablet; Take 1 tablet (20 mg total) by mouth every evening.  -     torsemide (DEMADEX) 20 MG Tab; Take 1 tablet (20 mg total) by mouth once daily. Take 1 tablet every morning/everyday.  -     traMADol (ULTRAM) 50 mg tablet; 1-2 q 6hr prn         -

## 2018-01-30 ENCOUNTER — OFFICE VISIT (OUTPATIENT)
Dept: HEMATOLOGY/ONCOLOGY | Facility: CLINIC | Age: 83
End: 2018-01-30
Payer: MEDICARE

## 2018-01-30 VITALS
DIASTOLIC BLOOD PRESSURE: 57 MMHG | HEIGHT: 68 IN | WEIGHT: 148.81 LBS | TEMPERATURE: 99 F | OXYGEN SATURATION: 99 % | HEART RATE: 84 BPM | BODY MASS INDEX: 22.55 KG/M2 | SYSTOLIC BLOOD PRESSURE: 117 MMHG

## 2018-01-30 DIAGNOSIS — N18.30 CHRONIC KIDNEY DISEASE, STAGE III (MODERATE): ICD-10-CM

## 2018-01-30 DIAGNOSIS — N18.30 ANEMIA OF CHRONIC RENAL FAILURE, STAGE 3 (MODERATE): ICD-10-CM

## 2018-01-30 DIAGNOSIS — C50.911 CARCINOMA OF RIGHT FEMALE BREAST, UNSPECIFIED ESTROGEN RECEPTOR STATUS, UNSPECIFIED SITE OF BREAST: Primary | ICD-10-CM

## 2018-01-30 DIAGNOSIS — D63.1 ANEMIA OF CHRONIC RENAL FAILURE, STAGE 3 (MODERATE): ICD-10-CM

## 2018-01-30 DIAGNOSIS — Z17.0 CARCINOMA OF LEFT BREAST IN FEMALE, ESTROGEN RECEPTOR POSITIVE, UNSPECIFIED SITE OF BREAST: ICD-10-CM

## 2018-01-30 DIAGNOSIS — C50.912 CARCINOMA OF LEFT BREAST IN FEMALE, ESTROGEN RECEPTOR POSITIVE, UNSPECIFIED SITE OF BREAST: ICD-10-CM

## 2018-01-30 PROCEDURE — 99213 OFFICE O/P EST LOW 20 MIN: CPT | Mod: PBBFAC | Performed by: INTERNAL MEDICINE

## 2018-01-30 PROCEDURE — 99999 PR PBB SHADOW E&M-EST. PATIENT-LVL III: CPT | Mod: PBBFAC,,, | Performed by: INTERNAL MEDICINE

## 2018-01-30 PROCEDURE — 99204 OFFICE O/P NEW MOD 45 MIN: CPT | Mod: S$PBB,,, | Performed by: INTERNAL MEDICINE

## 2018-01-30 NOTE — PROGRESS NOTES
Chief Complaint :  Breast Cancer.    Hx of Present illness :  Patient is a 82 y.o. year old female who presents to the clinic today for Oncology followup. Initial Dx of Cancer in right Breast had Mastectomy. Had Chemotherapy . About Two years later Dx'd to have Left Breast Cancer. Had Mastectomy followed by harmonal therapy. Completed Harmonal therapy about Six months ago.       Allergies :    Review of patient's allergies indicates:   Allergen Reactions    Antihistimine Other (See Comments)    Antivert  [meclizine] Rash       Occupation :  Retd from [employer]    Transfusion :  Years ago.    Menstrual & obstetric Hx :  5; Para 4.  Age of menarche: 14  Age of first pregnancy: 19  Lactation history: None  Age of menopause:  After Hysterectomy  HRT:  Yes    Present Meds :  Reviewed.    Medication List with Changes/Refills   Current Medications    ALLOPURINOL (ZYLOPRIM) 300 MG TABLET    Take 1 tablet (300 mg total) by mouth once daily. 1 Tablet Oral Every day    DABIGATRAN ETEXILATE (PRADAXA) 150 MG CAP    Take 1 capsule (150 mg total) by mouth 2 (two) times daily.    DESONIDE 0.05% (DESOWEN) 0.05 % OINT    AAA face bid    DIGOXIN (LANOXIN) 250 MCG TABLET    Take 1 tablet (250 mcg total) by mouth once daily. 1 Tablet Oral Every day    FLUTICASONE-SALMETEROL 250-50 MCG/DOSE (ADVAIR DISKUS) 250-50 MCG/DOSE DISKUS INHALER    Inhale 1 puff into the lungs 2 (two) times daily. 1 Disk with Device Inhalation Twice a day    GABAPENTIN (NEURONTIN) 100 MG CAPSULE    2 HS    LATANOPROST 0.005 % OPHTHALMIC SOLUTION        LIDOCAINE (LIDODERM) 5 %    Place 1 patch onto the skin once daily. Remove & Discard patch within 12 hours or as directed by MD    LOSARTAN (COZAAR) 50 MG TABLET    Take 1 tablet (50 mg total) by mouth once daily.    METFORMIN (GLUCOPHAGE-XR) 500 MG 24 HR TABLET    Take 1 tablet (500 mg total) by mouth once daily.    METOLAZONE (ZAROXOLYN) 2.5 MG TABLET    Take 1 tablet (2.5 mg total) by mouth  once daily.    MULTIVITAMIN CAPSULE    Take 1 capsule by mouth once daily.    SERTRALINE (ZOLOFT) 50 MG TABLET    Take 1 tablet (50 mg total) by mouth once daily.    SIMVASTATIN (ZOCOR) 20 MG TABLET    Take 1 tablet (20 mg total) by mouth every evening.    THEOPHYLLINE (THEODUR) 300 MG 12 HR TABLET    Take 1 tablet (300 mg total) by mouth every 12 (twelve) hours. 1 Tablet Sustained Release 12HR Oral Twice a day    TORSEMIDE (DEMADEX) 20 MG TAB    Take 1 tablet (20 mg total) by mouth once daily. Take 1 tablet every morning/everyday.    TRAMADOL (ULTRAM) 50 MG TABLET    1-2 q 6hr prn    VIT C/VIT E/LUTEIN/MIN/OMEGA-3 (OCUVITE ORAL)    Take by mouth.       Past Medical Hx :  reviewed    Past Medical Hx :  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    Atrial fibrillation 9/7/2012    Blood clotting tendency     Breast cancer     Chronic LBP 4/28/2015    CKD (chronic kidney disease) 6/30/2015    Diabetes mellitus type II, uncontrolled 12/11/2012    Diabetes mellitus with renal manifestations, controlled     Family history of colon cancer 10/7/2015    Fever blister     HTN (hypertension) 9/7/2012    Hyperlipemia     Hyperlipidemia 12/11/2012    Intrinsic asthma, unspecified 12/11/2012    Keloid cicatrix     Pacemaker     SQUAMOUS CELL CARCINOMA 7/2013    right dorsal hand       Travel Hx :  N/A    Immunization :  Immunization History   Administered Date(s) Administered    Td (ADULT) 07/14/2017       Family Hx :  Family History   Problem Relation Age of Onset    Melanoma Neg Hx     Eczema Neg Hx     Psoriasis Neg Hx        Social Hx :  Social History     Social History    Marital status:      Spouse name: N/A    Number of children: N/A    Years of education: N/A     Occupational History    Retired      Social History Main Topics    Smoking status: Never Smoker     Smokeless tobacco: Never Used    Alcohol use No    Drug use: No    Sexual activity: Not on file     Other Topics Concern    Are You Pregnant Or Think You May Be? No    Breast-Feeding No     Social History Narrative    No narrative on file       Surgery :  Bilateral Knee Surgery; Bilateral Cataract surgery; Bilateral Mastectomy. Cholecystectomy;  C;Spine surgery; Hysterectomy. Colonoscopy; Pacemaker    Symptoms :    Review of Systems   Constitutional: Negative for chills, diaphoresis, fever, malaise/fatigue and weight loss.   HENT: Negative for congestion, ear discharge, ear pain, hearing loss, nosebleeds, sinus pain, sore throat and tinnitus.    Eyes: Negative for blurred vision, double vision, photophobia, pain, discharge and redness.   Respiratory: Positive for shortness of breath (TORO). Negative for cough, hemoptysis, sputum production, wheezing and stridor.    Cardiovascular: Positive for orthopnea (TORO). Negative for chest pain, palpitations, claudication, leg swelling and PND.   Gastrointestinal: Positive for constipation. Negative for abdominal pain, blood in stool, diarrhea, heartburn, nausea and vomiting.   Genitourinary: Negative for dysuria, flank pain, frequency, hematuria and urgency.   Musculoskeletal: Positive for back pain and joint pain. Negative for falls.   Skin: Negative for itching and rash.   Neurological: Negative for dizziness, tremors, sensory change, speech change, focal weakness, seizures, loss of consciousness, weakness and headaches.   Endo/Heme/Allergies: Negative for polydipsia. Does not bruise/bleed easily.   Psychiatric/Behavioral: Positive for depression (Yes). Negative for hallucinations, memory loss, substance abuse and suicidal ideas. The patient is not nervous/anxious and does not have insomnia.        Physical Exam :   Physical Exam   Constitutional: She is oriented to person, place, and time and well-developed, well-nourished, and in no distress. No distress.   HENT:    Head: Normocephalic and atraumatic.   Right Ear: External ear normal.   Left Ear: External ear normal.   Nose: Nose normal.   Mouth/Throat: Oropharynx is clear and moist.   Eyes: Conjunctivae, EOM and lids are normal. Lids are everted and swept, no foreign bodies found. Pupils are equal.       Neck: Trachea normal and normal range of motion. Normal carotid pulses and no hepatojugular reflux present. No tracheal tenderness present. Carotid bruit is not present. No tracheal deviation present. No thyroid mass and no thyromegaly present.   Cardiovascular: Normal rate, normal heart sounds and normal pulses.  An irregularly irregular rhythm present. PMI is not displaced.    Pulmonary/Chest: Effort normal and breath sounds normal. No stridor.       Abdominal: Soft. Normal appearance, normal aorta and bowel sounds are normal. She exhibits no distension and no mass. There is no hepatosplenomegaly, splenomegaly or hepatomegaly. There is no tenderness. There is no CVA tenderness. No hernia.   Genitourinary:   Genitourinary Comments: Not Examined   Musculoskeletal: Normal range of motion.   Lymphadenopathy:        Head (right side): No submental, no submandibular, no tonsillar, no preauricular, no posterior auricular and no occipital adenopathy present.        Head (left side): No submental, no submandibular, no tonsillar, no preauricular, no posterior auricular and no occipital adenopathy present.     She has no cervical adenopathy.     She has no axillary adenopathy.        Right: No inguinal, no supraclavicular and no epitrochlear adenopathy present.        Left: No inguinal, no supraclavicular and no epitrochlear adenopathy present.   Neurological: She is alert and oriented to person, place, and time. She has normal motor skills, normal sensation, normal strength, normal reflexes and intact cranial nerves. Gait normal. GCS score is 15.   Skin: Skin is warm, dry and intact. No rash noted. She is not diaphoretic. No  cyanosis. Nails show no clubbing.   Psychiatric: Mood, memory, affect and judgment normal.         Labs & Imaging : 01/24/18Hgb 11 to 9.9; Hct 35 to 33.3 Plts 318,000 ANC 4.200. Cr.1.3; eGFR 38. Bili 0.5 Ca 9.9 TSH 3.048        Dx :  Bilateral Breast Cancer in remission. CKD 3. Anemia of Kidney disease      Assessment & Plan: Discussed with Patient. Does not meet Criteria for Procrit therapy. Monitor Labs. Request from [facility], Iron Panel In one Month

## 2018-02-28 ENCOUNTER — LAB VISIT (OUTPATIENT)
Dept: LAB | Facility: HOSPITAL | Age: 83
End: 2018-02-28
Attending: INTERNAL MEDICINE
Payer: MEDICARE

## 2018-02-28 DIAGNOSIS — N18.30 ANEMIA OF CHRONIC RENAL FAILURE, STAGE 3 (MODERATE): ICD-10-CM

## 2018-02-28 DIAGNOSIS — D63.1 ANEMIA OF CHRONIC RENAL FAILURE, STAGE 3 (MODERATE): ICD-10-CM

## 2018-02-28 LAB
BASOPHILS # BLD AUTO: 0.04 K/UL
BASOPHILS NFR BLD: 0.8 %
DIFFERENTIAL METHOD: ABNORMAL
EOSINOPHIL # BLD AUTO: 0.2 K/UL
EOSINOPHIL NFR BLD: 3.4 %
ERYTHROCYTE [DISTWIDTH] IN BLOOD BY AUTOMATED COUNT: 18.1 %
HCT VFR BLD AUTO: 33.3 %
HGB BLD-MCNC: 10.3 G/DL
IRON SERPL-MCNC: 29 UG/DL
LYMPHOCYTES # BLD AUTO: 1.3 K/UL
LYMPHOCYTES NFR BLD: 25 %
MCH RBC QN AUTO: 26 PG
MCHC RBC AUTO-ENTMCNC: 30.9 G/DL
MCV RBC AUTO: 84 FL
MONOCYTES # BLD AUTO: 0.7 K/UL
MONOCYTES NFR BLD: 12.9 %
NEUTROPHILS # BLD AUTO: 3.1 K/UL
NEUTROPHILS NFR BLD: 57.9 %
PLATELET # BLD AUTO: 209 K/UL
PMV BLD AUTO: 9.6 FL
RBC # BLD AUTO: 3.96 M/UL
SATURATED IRON: 7 %
TOTAL IRON BINDING CAPACITY: 391 UG/DL
TRANSFERRIN SERPL-MCNC: 264 MG/DL
WBC # BLD AUTO: 5.27 K/UL

## 2018-02-28 PROCEDURE — 36415 COLL VENOUS BLD VENIPUNCTURE: CPT | Mod: PO

## 2018-02-28 PROCEDURE — 83540 ASSAY OF IRON: CPT

## 2018-02-28 PROCEDURE — 85025 COMPLETE CBC W/AUTO DIFF WBC: CPT | Mod: PO

## 2018-05-09 NOTE — TELEPHONE ENCOUNTER
----- Message from Nery Barrera sent at 5/9/2018  4:20 PM CDT -----  Contact: Self   Patient says she need a refill on her medication. But she would like to  the script herself around 1 pm tomorrow. Please call patient at [phone] when ready.      ranitidine (ZANTAC) 150 MG capsule

## 2018-05-14 ENCOUNTER — TELEPHONE (OUTPATIENT)
Dept: HEMATOLOGY/ONCOLOGY | Facility: CLINIC | Age: 83
End: 2018-05-14

## 2018-05-14 DIAGNOSIS — C50.911 CARCINOMA OF RIGHT FEMALE BREAST, UNSPECIFIED ESTROGEN RECEPTOR STATUS, UNSPECIFIED SITE OF BREAST: Primary | ICD-10-CM

## 2018-05-14 DIAGNOSIS — C50.912 CARCINOMA OF LEFT BREAST IN FEMALE, ESTROGEN RECEPTOR POSITIVE, UNSPECIFIED SITE OF BREAST: ICD-10-CM

## 2018-05-14 DIAGNOSIS — C50.919 BREAST CANCER: Primary | ICD-10-CM

## 2018-05-14 DIAGNOSIS — Z17.0 CARCINOMA OF LEFT BREAST IN FEMALE, ESTROGEN RECEPTOR POSITIVE, UNSPECIFIED SITE OF BREAST: ICD-10-CM

## 2018-05-14 DIAGNOSIS — N18.30 CHRONIC KIDNEY DISEASE, STAGE III (MODERATE): ICD-10-CM

## 2018-05-29 ENCOUNTER — LAB VISIT (OUTPATIENT)
Dept: LAB | Facility: HOSPITAL | Age: 83
End: 2018-05-29
Attending: INTERNAL MEDICINE
Payer: MEDICARE

## 2018-05-29 DIAGNOSIS — C50.919 BREAST CANCER: ICD-10-CM

## 2018-05-29 LAB
ALBUMIN SERPL BCP-MCNC: 4 G/DL
ALP SERPL-CCNC: 59 U/L
ALT SERPL W/O P-5'-P-CCNC: 9 U/L
ANION GAP SERPL CALC-SCNC: 9 MMOL/L
AST SERPL-CCNC: 20 U/L
BASOPHILS # BLD AUTO: 0.05 K/UL
BASOPHILS NFR BLD: 0.9 %
BILIRUB SERPL-MCNC: 0.6 MG/DL
BUN SERPL-MCNC: 29 MG/DL
CALCIUM SERPL-MCNC: 10.1 MG/DL
CHLORIDE SERPL-SCNC: 99 MMOL/L
CO2 SERPL-SCNC: 32 MMOL/L
CREAT SERPL-MCNC: 1.6 MG/DL
DIFFERENTIAL METHOD: ABNORMAL
EOSINOPHIL # BLD AUTO: 0.1 K/UL
EOSINOPHIL NFR BLD: 2.4 %
ERYTHROCYTE [DISTWIDTH] IN BLOOD BY AUTOMATED COUNT: 18.7 %
EST. GFR  (AFRICAN AMERICAN): 34.3 ML/MIN/1.73 M^2
EST. GFR  (NON AFRICAN AMERICAN): 29.8 ML/MIN/1.73 M^2
GLUCOSE SERPL-MCNC: 115 MG/DL
HCT VFR BLD AUTO: 32.7 %
HGB BLD-MCNC: 10.2 G/DL
LYMPHOCYTES # BLD AUTO: 1.4 K/UL
LYMPHOCYTES NFR BLD: 26.1 %
MCH RBC QN AUTO: 26.4 PG
MCHC RBC AUTO-ENTMCNC: 31.2 G/DL
MCV RBC AUTO: 85 FL
MONOCYTES # BLD AUTO: 0.7 K/UL
MONOCYTES NFR BLD: 12.5 %
NEUTROPHILS # BLD AUTO: 3.2 K/UL
NEUTROPHILS NFR BLD: 58.1 %
PLATELET # BLD AUTO: 205 K/UL
PMV BLD AUTO: 9.6 FL
POTASSIUM SERPL-SCNC: 4.2 MMOL/L
PROT SERPL-MCNC: 7.3 G/DL
RBC # BLD AUTO: 3.86 M/UL
SODIUM SERPL-SCNC: 140 MMOL/L
WBC # BLD AUTO: 5.52 K/UL

## 2018-05-29 PROCEDURE — 36415 COLL VENOUS BLD VENIPUNCTURE: CPT | Mod: PO

## 2018-05-29 PROCEDURE — 85025 COMPLETE CBC W/AUTO DIFF WBC: CPT | Mod: PO

## 2018-05-29 PROCEDURE — 80053 COMPREHEN METABOLIC PANEL: CPT

## 2018-05-29 NOTE — PROGRESS NOTES
Chief Complaint :  Breast Cancer.    Hx of Present illness :  Patient is a 82 y.o. year old female who presents to the clinic today for Oncology followup. Initial Dx of Cancer in right Breast had Mastectomy. Had Chemotherapy . About Two years later Dx'd to have Left Breast Cancer. Had Mastectomy followed by harmonal therapy. Completed Harmonal therapy about  Ten.  months ago.  Appetite good; no bowel or urinary Sx. No fever, cough, dyspnea.  No headache, dizziness, nausea or vomiting. No overt bleeding. Chronic back pain; right rotator cuff. Walks half a mile every day      Allergies :    Review of patient's allergies indicates:   Allergen Reactions    Antihistimine Other (See Comments)    Antivert  [meclizine] Rash       Occupation :  Retd from [employer]    Transfusion :  Years ago.    Menstrual & obstetric Hx :  5; Para 4.  Age of menarche: 14  Age of first pregnancy: 19  Lactation history: None  Age of menopause:  After Hysterectomy  HRT:  Yes    Present Meds :  Reviewed.    Medication List with Changes/Refills   Current Medications    ALLOPURINOL (ZYLOPRIM) 300 MG TABLET    Take 1 tablet (300 mg total) by mouth once daily. 1 Tablet Oral Every day    DABIGATRAN ETEXILATE (PRADAXA) 150 MG CAP    Take 1 capsule (150 mg total) by mouth 2 (two) times daily.    DESONIDE 0.05% (DESOWEN) 0.05 % OINT    AAA face bid    DIGOXIN (LANOXIN) 250 MCG TABLET    Take 1 tablet (250 mcg total) by mouth once daily. 1 Tablet Oral Every day    FLUTICASONE-SALMETEROL 250-50 MCG/DOSE (ADVAIR DISKUS) 250-50 MCG/DOSE DISKUS INHALER    Inhale 1 puff into the lungs 2 (two) times daily. 1 Disk with Device Inhalation Twice a day    GABAPENTIN (NEURONTIN) 100 MG CAPSULE    2 HS    LATANOPROST 0.005 % OPHTHALMIC SOLUTION        LIDOCAINE (LIDODERM) 5 %    Place 1 patch onto the skin once daily. Remove & Discard patch within 12 hours or as directed by MD    LOSARTAN (COZAAR) 50 MG TABLET    Take 1 tablet (50 mg total) by  mouth once daily.    METFORMIN (GLUCOPHAGE-XR) 500 MG 24 HR TABLET    Take 1 tablet (500 mg total) by mouth once daily.    METOLAZONE (ZAROXOLYN) 2.5 MG TABLET    Take 1 tablet (2.5 mg total) by mouth once daily.    MULTIVITAMIN CAPSULE    Take 1 capsule by mouth once daily.    RANITIDINE (ZANTAC) 150 MG CAPSULE    Take 1 capsule (150 mg total) by mouth 2 (two) times daily. 1 Capsule Oral Three times a day    SERTRALINE (ZOLOFT) 50 MG TABLET    Take 1 tablet (50 mg total) by mouth once daily.    SIMVASTATIN (ZOCOR) 20 MG TABLET    Take 1 tablet (20 mg total) by mouth every evening.    THEOPHYLLINE (THEODUR) 300 MG 12 HR TABLET    Take 1 tablet (300 mg total) by mouth every 12 (twelve) hours. 1 Tablet Sustained Release 12HR Oral Twice a day    TORSEMIDE (DEMADEX) 20 MG TAB    Take 1 tablet (20 mg total) by mouth once daily. Take 1 tablet every morning/everyday.    TRAMADOL (ULTRAM) 50 MG TABLET    1-2 q 6hr prn    VIT C/VIT E/LUTEIN/MIN/OMEGA-3 (OCUVITE ORAL)    Take by mouth.       Past Medical Hx :  reviewed    Past Medical Hx :  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    Atrial fibrillation 9/7/2012    Blood clotting tendency     Breast cancer     Chronic LBP 4/28/2015    CKD (chronic kidney disease) 6/30/2015    Diabetes mellitus type II, uncontrolled 12/11/2012    Diabetes mellitus with renal manifestations, controlled     Family history of colon cancer 10/7/2015    Fever blister     HTN (hypertension) 9/7/2012    Hyperlipemia     Hyperlipidemia 12/11/2012    Intrinsic asthma, unspecified 12/11/2012    Keloid cicatrix     Pacemaker     SQUAMOUS CELL CARCINOMA 7/2013    right dorsal hand       Travel Hx :  N/A    Immunization :  Immunization History   Administered Date(s) Administered    Td (ADULT) 07/14/2017       Family Hx :  Family History   Problem Relation Age  of Onset    Melanoma Neg Hx     Eczema Neg Hx     Psoriasis Neg Hx        Social Hx :  Social History     Social History    Marital status:      Spouse name: N/A    Number of children: N/A    Years of education: N/A     Occupational History    Retired      Social History Main Topics    Smoking status: Never Smoker    Smokeless tobacco: Never Used    Alcohol use No    Drug use: No    Sexual activity: Not on file     Other Topics Concern    Are You Pregnant Or Think You May Be? No    Breast-Feeding No     Social History Narrative    No narrative on file       Surgery :  Bilateral Knee Surgery; Bilateral Cataract surgery; Bilateral Mastectomy. Cholecystectomy;  C;Spine surgery; Hysterectomy. Colonoscopy; Pacemaker    Symptoms :    Review of Systems   Constitutional: Negative for chills, diaphoresis, fever, malaise/fatigue and weight loss.   HENT: Negative for congestion, ear discharge, ear pain, hearing loss, nosebleeds, sinus pain, sore throat and tinnitus.    Eyes: Negative for blurred vision, double vision, photophobia, pain, discharge and redness.   Respiratory: Positive for shortness of breath (TORO). Negative for cough, hemoptysis, sputum production, wheezing and stridor.    Cardiovascular: Positive for orthopnea (TORO). Negative for chest pain, palpitations, claudication, leg swelling and PND.   Gastrointestinal: Positive for constipation. Negative for abdominal pain, blood in stool, diarrhea, heartburn, nausea and vomiting.   Genitourinary: Negative for dysuria, flank pain, frequency, hematuria and urgency.   Musculoskeletal: Positive for back pain and joint pain. Negative for falls.   Skin: Negative for itching and rash.   Neurological: Negative for dizziness, tremors, sensory change, speech change, focal weakness, seizures, loss of consciousness, weakness and headaches.   Endo/Heme/Allergies: Negative for polydipsia. Does not bruise/bleed easily.   Psychiatric/Behavioral: Positive for  depression (Yes). Negative for hallucinations, memory loss, substance abuse and suicidal ideas. The patient is not nervous/anxious and does not have insomnia.        Physical Exam :   Physical Exam   Constitutional: She is oriented to person, place, and time and well-developed, well-nourished, and in no distress. No distress.   HENT:   Head: Normocephalic and atraumatic.   Right Ear: External ear normal.   Left Ear: External ear normal.   Nose: Nose normal.   Mouth/Throat: Oropharynx is clear and moist.   Eyes: Conjunctivae, EOM and lids are normal. Lids are everted and swept, no foreign bodies found. Pupils are equal.       Neck: Trachea normal and normal range of motion. Normal carotid pulses and no hepatojugular reflux present. No tracheal tenderness present. Carotid bruit is not present. No tracheal deviation present. No thyroid mass and no thyromegaly present.   Cardiovascular: Normal rate, normal heart sounds and normal pulses.  An irregularly irregular rhythm present. PMI is not displaced.    Pulmonary/Chest: Effort normal and breath sounds normal. No stridor.       Abdominal: Soft. Normal appearance, normal aorta and bowel sounds are normal. She exhibits no distension and no mass. There is no hepatosplenomegaly, splenomegaly or hepatomegaly. There is no tenderness. There is no CVA tenderness. No hernia.   Genitourinary:   Genitourinary Comments: Not Examined   Musculoskeletal: Normal range of motion.   Lymphadenopathy:        Head (right side): No submental, no submandibular, no tonsillar, no preauricular, no posterior auricular and no occipital adenopathy present.        Head (left side): No submental, no submandibular, no tonsillar, no preauricular, no posterior auricular and no occipital adenopathy present.     She has no cervical adenopathy.     She has no axillary adenopathy.        Right: No inguinal, no supraclavicular and no epitrochlear adenopathy present.        Left: No inguinal, no  supraclavicular and no epitrochlear adenopathy present.   Neurological: She is alert and oriented to person, place, and time. She has normal motor skills, normal sensation, normal strength, normal reflexes and intact cranial nerves. Gait normal. GCS score is 15.   Skin: Skin is warm, dry and intact. No rash noted. She is not diaphoretic. No cyanosis. Nails show no clubbing.   Psychiatric: Mood, memory, affect and judgment normal.         Labs & Imaging : 01/24/18Hgb 11 to 9.9; Hct 35 to 33.3 Plts 318,000 ANC 4.200. Cr.1.3; eGFR 38. Bili 0.5 Ca 9.9 TSH 3.048  05/29/18 :  Hgb 10.2; Hct 32.7; MCV 85. Platelets 205,000 ANC 3,200   Cr.1.6; Hgb 10.1 Bili 0.6 ALP 59 eGFR 29.8      Dx :  Bilateral Breast Cancer in remission. CKD 3. Anemia of Kidney disease      Assessment & Plan: Discussed with Patient. Does not meet Criteria for Procrit therapy. Monitor Labs.  Refer to nephrologist . RTC 3 months

## 2018-05-31 ENCOUNTER — OFFICE VISIT (OUTPATIENT)
Dept: HEMATOLOGY/ONCOLOGY | Facility: CLINIC | Age: 83
End: 2018-05-31
Payer: MEDICARE

## 2018-05-31 VITALS
BODY MASS INDEX: 23.4 KG/M2 | SYSTOLIC BLOOD PRESSURE: 109 MMHG | TEMPERATURE: 98 F | DIASTOLIC BLOOD PRESSURE: 55 MMHG | HEART RATE: 69 BPM | WEIGHT: 153.88 LBS | OXYGEN SATURATION: 99 %

## 2018-05-31 DIAGNOSIS — C50.912 CARCINOMA OF LEFT BREAST IN FEMALE, ESTROGEN RECEPTOR POSITIVE, UNSPECIFIED SITE OF BREAST: ICD-10-CM

## 2018-05-31 DIAGNOSIS — D63.1 ANEMIA OF CHRONIC RENAL FAILURE, STAGE 3 (MODERATE): ICD-10-CM

## 2018-05-31 DIAGNOSIS — N18.30 CHRONIC KIDNEY DISEASE, STAGE III (MODERATE): ICD-10-CM

## 2018-05-31 DIAGNOSIS — Z17.0 CARCINOMA OF LEFT BREAST IN FEMALE, ESTROGEN RECEPTOR POSITIVE, UNSPECIFIED SITE OF BREAST: ICD-10-CM

## 2018-05-31 DIAGNOSIS — C50.911 CARCINOMA OF RIGHT FEMALE BREAST, UNSPECIFIED ESTROGEN RECEPTOR STATUS, UNSPECIFIED SITE OF BREAST: Primary | ICD-10-CM

## 2018-05-31 DIAGNOSIS — N18.30 ANEMIA OF CHRONIC RENAL FAILURE, STAGE 3 (MODERATE): ICD-10-CM

## 2018-05-31 PROCEDURE — 99213 OFFICE O/P EST LOW 20 MIN: CPT | Mod: PBBFAC | Performed by: INTERNAL MEDICINE

## 2018-05-31 PROCEDURE — 99213 OFFICE O/P EST LOW 20 MIN: CPT | Mod: S$PBB,,, | Performed by: INTERNAL MEDICINE

## 2018-05-31 PROCEDURE — 99999 PR PBB SHADOW E&M-EST. PATIENT-LVL III: CPT | Mod: PBBFAC,,, | Performed by: INTERNAL MEDICINE

## 2018-05-31 RX ORDER — SILDENAFIL CITRATE 20 MG/1
TABLET, FILM COATED ORAL
COMMUNITY
Start: 2018-05-04 | End: 2019-02-05 | Stop reason: SDUPTHER

## 2018-06-01 ENCOUNTER — TELEPHONE (OUTPATIENT)
Dept: NEPHROLOGY | Facility: CLINIC | Age: 83
End: 2018-06-01

## 2018-06-01 NOTE — TELEPHONE ENCOUNTER
Called pt to schedule appt no answer and I couldn't leave a message  ----- Message from Sherrie Charissa sent at 6/1/2018 10:58 AM CDT -----  Contact: eliel  tel:   [phone]  Pt.says she needs a f/u w/ Dr. Middleton.     Pls call w/a date and time.

## 2018-06-15 ENCOUNTER — TELEPHONE (OUTPATIENT)
Dept: NEPHROLOGY | Facility: CLINIC | Age: 83
End: 2018-06-15

## 2018-06-15 NOTE — TELEPHONE ENCOUNTER
----- Message from Janice Sher MA sent at 5/31/2018 10:54 AM CDT -----  Good morning, Im trying to schedule a patient with  but it seems you guys don't have any upcoming appt's for awhile. Is there anyway you guys can schedule this patient at the [facility] location for me, transportation is very limited, so it's impossible for her to go to main [place]. She's being referred to see  for CKD. Thanks in advanced.     I spoke to pt and she stated that she will keep her appt on 7/17/2018 with Dr. Middleton then will schedule f/u with Dr. Bartlett on the [facility]

## 2018-07-17 ENCOUNTER — OFFICE VISIT (OUTPATIENT)
Dept: NEPHROLOGY | Facility: CLINIC | Age: 83
End: 2018-07-17
Payer: MEDICARE

## 2018-07-17 ENCOUNTER — LAB VISIT (OUTPATIENT)
Dept: LAB | Facility: HOSPITAL | Age: 83
End: 2018-07-17
Attending: INTERNAL MEDICINE
Payer: MEDICARE

## 2018-07-17 VITALS
HEART RATE: 71 BPM | OXYGEN SATURATION: 96 % | DIASTOLIC BLOOD PRESSURE: 50 MMHG | HEIGHT: 68 IN | SYSTOLIC BLOOD PRESSURE: 100 MMHG | WEIGHT: 157.44 LBS | BODY MASS INDEX: 23.86 KG/M2

## 2018-07-17 DIAGNOSIS — I27.20 PULMONARY HYPERTENSION: ICD-10-CM

## 2018-07-17 DIAGNOSIS — E11.21 DIABETIC NEPHROPATHY ASSOCIATED WITH TYPE 2 DIABETES MELLITUS: ICD-10-CM

## 2018-07-17 DIAGNOSIS — N18.30 CHRONIC KIDNEY DISEASE (CKD), STAGE III (MODERATE): Primary | ICD-10-CM

## 2018-07-17 DIAGNOSIS — Z85.3 HX: BREAST CANCER: ICD-10-CM

## 2018-07-17 DIAGNOSIS — N18.30 CHRONIC KIDNEY DISEASE (CKD), STAGE III (MODERATE): ICD-10-CM

## 2018-07-17 LAB
ALBUMIN SERPL BCP-MCNC: 3.9 G/DL
ANION GAP SERPL CALC-SCNC: 7 MMOL/L
BUN SERPL-MCNC: 26 MG/DL
CALCIUM SERPL-MCNC: 10.1 MG/DL
CHLORIDE SERPL-SCNC: 98 MMOL/L
CO2 SERPL-SCNC: 32 MMOL/L
CREAT SERPL-MCNC: 1.4 MG/DL
EST. GFR  (AFRICAN AMERICAN): 40.4 ML/MIN/1.73 M^2
EST. GFR  (NON AFRICAN AMERICAN): 35 ML/MIN/1.73 M^2
GLUCOSE SERPL-MCNC: 112 MG/DL
PHOSPHATE SERPL-MCNC: 2.8 MG/DL
POTASSIUM SERPL-SCNC: 3.7 MMOL/L
SODIUM SERPL-SCNC: 137 MMOL/L

## 2018-07-17 PROCEDURE — 80069 RENAL FUNCTION PANEL: CPT

## 2018-07-17 PROCEDURE — 99214 OFFICE O/P EST MOD 30 MIN: CPT | Mod: PBBFAC | Performed by: INTERNAL MEDICINE

## 2018-07-17 PROCEDURE — 36415 COLL VENOUS BLD VENIPUNCTURE: CPT

## 2018-07-17 PROCEDURE — 99999 PR PBB SHADOW E&M-EST. PATIENT-LVL IV: CPT | Mod: PBBFAC,,, | Performed by: INTERNAL MEDICINE

## 2018-07-17 PROCEDURE — 99213 OFFICE O/P EST LOW 20 MIN: CPT | Mod: S$PBB,,, | Performed by: INTERNAL MEDICINE

## 2018-07-17 NOTE — PROGRESS NOTES
Patient is here today for evaluation of  CKD III. Last seen in  renal office 6/12/17 Baseline Cr 1.2-1.5 mg/d; Her most recent lab listed below There is hx of diabetes (last A1c; 5.8%); pulmonary hypertension and breast CA. Current meds include; torsemide; metol zone (prn) Today she has no new complaints  Lab Results   Component Value Date    K 4.2 05/29/2018    CREATININE 1.6 (H) 05/29/2018    ESTGFRAFRICA 34.3 (A) 05/29/2018    EGFRNONAA 29.8 (A) 05/29/2018       Physical Exam     Chronically ill woman; no acute distress; oriented x3  /50  HEENT; Grossly Intact  CHEST; Clear P&A; no rales or rhonchi  HEART; RR; S1&S2 no murmur rub gallop  ABD; BS(+); non-tender; (-)CVAT  EXT; (+) Edema; bilateral    Impression  CKD II; Cr 1.4 mg/dl; eGFR; 35 ml/min; potassium 3.7 mmol/L  At baseline    Diabetes Excellent glycemic control      Plan  Return Visit; 6 mo

## 2018-08-22 ENCOUNTER — LAB VISIT (OUTPATIENT)
Dept: LAB | Facility: HOSPITAL | Age: 83
End: 2018-08-22
Attending: INTERNAL MEDICINE
Payer: MEDICARE

## 2018-08-22 DIAGNOSIS — Z17.0 CARCINOMA OF LEFT BREAST IN FEMALE, ESTROGEN RECEPTOR POSITIVE, UNSPECIFIED SITE OF BREAST: ICD-10-CM

## 2018-08-22 DIAGNOSIS — N18.30 CHRONIC KIDNEY DISEASE, STAGE III (MODERATE): ICD-10-CM

## 2018-08-22 DIAGNOSIS — C50.912 CARCINOMA OF LEFT BREAST IN FEMALE, ESTROGEN RECEPTOR POSITIVE, UNSPECIFIED SITE OF BREAST: ICD-10-CM

## 2018-08-22 DIAGNOSIS — N18.30 ANEMIA OF CHRONIC RENAL FAILURE, STAGE 3 (MODERATE): ICD-10-CM

## 2018-08-22 DIAGNOSIS — D63.1 ANEMIA OF CHRONIC RENAL FAILURE, STAGE 3 (MODERATE): ICD-10-CM

## 2018-08-22 DIAGNOSIS — C50.911 CARCINOMA OF RIGHT FEMALE BREAST, UNSPECIFIED ESTROGEN RECEPTOR STATUS, UNSPECIFIED SITE OF BREAST: ICD-10-CM

## 2018-08-22 LAB
ALBUMIN SERPL BCP-MCNC: 4 G/DL
ALP SERPL-CCNC: 58 U/L
ALT SERPL W/O P-5'-P-CCNC: 9 U/L
ANION GAP SERPL CALC-SCNC: 9 MMOL/L
AST SERPL-CCNC: 20 U/L
BASOPHILS # BLD AUTO: 0.04 K/UL
BASOPHILS NFR BLD: 0.7 %
BILIRUB SERPL-MCNC: 0.8 MG/DL
BUN SERPL-MCNC: 25 MG/DL
CALCIUM SERPL-MCNC: 10.3 MG/DL
CHLORIDE SERPL-SCNC: 101 MMOL/L
CO2 SERPL-SCNC: 30 MMOL/L
CREAT SERPL-MCNC: 1.4 MG/DL
DIFFERENTIAL METHOD: ABNORMAL
EOSINOPHIL # BLD AUTO: 0.1 K/UL
EOSINOPHIL NFR BLD: 1.3 %
ERYTHROCYTE [DISTWIDTH] IN BLOOD BY AUTOMATED COUNT: 19.7 %
EST. GFR  (AFRICAN AMERICAN): 40.4 ML/MIN/1.73 M^2
EST. GFR  (NON AFRICAN AMERICAN): 35 ML/MIN/1.73 M^2
GLUCOSE SERPL-MCNC: 139 MG/DL
HCT VFR BLD AUTO: 31.2 %
HGB BLD-MCNC: 9.6 G/DL
LYMPHOCYTES # BLD AUTO: 1.2 K/UL
LYMPHOCYTES NFR BLD: 20.9 %
MCH RBC QN AUTO: 25.5 PG
MCHC RBC AUTO-ENTMCNC: 30.8 G/DL
MCV RBC AUTO: 83 FL
MONOCYTES # BLD AUTO: 0.7 K/UL
MONOCYTES NFR BLD: 11.6 %
NEUTROPHILS # BLD AUTO: 3.7 K/UL
NEUTROPHILS NFR BLD: 65.1 %
NRBC BLD-RTO: 0 /100 WBC
PLATELET # BLD AUTO: 206 K/UL
PMV BLD AUTO: 10.6 FL
POTASSIUM SERPL-SCNC: 3.9 MMOL/L
PROT SERPL-MCNC: 7.3 G/DL
RBC # BLD AUTO: 3.77 M/UL
SODIUM SERPL-SCNC: 140 MMOL/L
WBC # BLD AUTO: 5.6 K/UL

## 2018-08-22 PROCEDURE — 80053 COMPREHEN METABOLIC PANEL: CPT

## 2018-08-22 PROCEDURE — 85025 COMPLETE CBC W/AUTO DIFF WBC: CPT

## 2018-08-22 PROCEDURE — 36415 COLL VENOUS BLD VENIPUNCTURE: CPT | Mod: PO

## 2018-08-28 NOTE — PROGRESS NOTES
Chief Complaint :  Breast Cancer.    Hx of Present illness :  Patient is a 82 y.o. year old female who presents to the clinic today for Oncology followup. Initial Dx of Cancer in right Breast had Mastectomy. Had Chemotherapy . About Two years later Dx'd to have Left Breast Cancer. Had Mastectomy followed by harmonal therapy. Completed Harmonal therapy about  Ten.  months ago.  Appetite good; no bowel or urinary Sx. No fever, cough, dyspnea.  No headache, dizziness, nausea or vomiting. No overt bleeding. States active      Allergies :    Review of patient's allergies indicates:   Allergen Reactions    Antihistimine Other (See Comments)    Antivert  [meclizine] Rash       Occupation :  Retd from [employer]    Transfusion :  Years ago.    Menstrual & obstetric Hx :  5; Para 4.  Age of menarche: 14  Age of first pregnancy: 19  Lactation history: None  Age of menopause:  After Hysterectomy  HRT:  Yes    Present Meds :  Reviewed.    Medication List with Changes/Refills   Current Medications    ALLOPURINOL (ZYLOPRIM) 300 MG TABLET    Take 1 tablet (300 mg total) by mouth once daily. 1 Tablet Oral Every day    DABIGATRAN ETEXILATE (PRADAXA) 150 MG CAP    Take 1 capsule (150 mg total) by mouth 2 (two) times daily.    DEMADEX 10 MG TAB        DESONIDE 0.05% (DESOWEN) 0.05 % OINT    AAA face bid    DIGOXIN (LANOXIN) 250 MCG TABLET    Take 1 tablet (250 mcg total) by mouth once daily. 1 Tablet Oral Every day    FLUTICASONE-SALMETEROL 250-50 MCG/DOSE (ADVAIR DISKUS) 250-50 MCG/DOSE DISKUS INHALER    Inhale 1 puff into the lungs 2 (two) times daily. 1 Disk with Device Inhalation Twice a day    GABAPENTIN (NEURONTIN) 100 MG CAPSULE    2 HS    LATANOPROST 0.005 % OPHTHALMIC SOLUTION        LIDOCAINE (LIDODERM) 5 %    Place 1 patch onto the skin once daily. Remove & Discard patch within 12 hours or as directed by MD    LOSARTAN (COZAAR) 50 MG TABLET    Take 1 tablet (50 mg total) by mouth once daily.    METFORMIN  (GLUCOPHAGE-XR) 500 MG 24 HR TABLET    Take 1 tablet (500 mg total) by mouth once daily.    METOLAZONE (ZAROXOLYN) 2.5 MG TABLET    Take 1 tablet (2.5 mg total) by mouth once daily.    MULTIVITAMIN CAPSULE    Take 1 capsule by mouth once daily.    RANITIDINE (ZANTAC) 150 MG CAPSULE    Take 1 capsule (150 mg total) by mouth 2 (two) times daily. 1 Capsule Oral Three times a day    REVATIO 20 MG TAB        SERTRALINE (ZOLOFT) 50 MG TABLET    Take 1 tablet (50 mg total) by mouth once daily.    SIMVASTATIN (ZOCOR) 20 MG TABLET    Take 1 tablet (20 mg total) by mouth every evening.    THEOPHYLLINE (THEODUR) 300 MG 12 HR TABLET    Take 1 tablet (300 mg total) by mouth every 12 (twelve) hours. 1 Tablet Sustained Release 12HR Oral Twice a day    TORSEMIDE (DEMADEX) 20 MG TAB    Take 1 tablet (20 mg total) by mouth once daily. Take 1 tablet every morning/everyday.    TRAMADOL (ULTRAM) 50 MG TABLET    1-2 q 6hr prn    VIT C/VIT E/LUTEIN/MIN/OMEGA-3 (OCUVITE ORAL)    Take by mouth.       Past Medical Hx :  reviewed    Past Medical Hx :  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    Atrial fibrillation 9/7/2012    Blood clotting tendency     Breast cancer     Chronic LBP 4/28/2015    CKD (chronic kidney disease) 6/30/2015    Diabetes mellitus type II, uncontrolled 12/11/2012    Diabetes mellitus with renal manifestations, controlled     Family history of colon cancer 10/7/2015    Fever blister     HTN (hypertension) 9/7/2012    Hyperlipemia     Hyperlipidemia 12/11/2012    Intrinsic asthma, unspecified 12/11/2012    Keloid cicatrix     Pacemaker     Squamous Cell Carcinoma 7/2013    right dorsal hand       Travel Hx :  N/A    Immunization :  Immunization History   Administered Date(s) Administered    Td (ADULT) 07/14/2017       Family Hx :  Family History   Problem Relation Age of  Onset    Melanoma Neg Hx     Eczema Neg Hx     Psoriasis Neg Hx        Social Hx :  Social History     Socioeconomic History    Marital status:      Spouse name: Not on file    Number of children: Not on file    Years of education: Not on file    Highest education level: Not on file   Social Needs    Financial resource strain: Not on file    Food insecurity - worry: Not on file    Food insecurity - inability: Not on file    Transportation needs - medical: Not on file    Transportation needs - non-medical: Not on file   Occupational History    Occupation: Retired   Tobacco Use    Smoking status: Never Smoker    Smokeless tobacco: Never Used   Substance and Sexual Activity    Alcohol use: No    Drug use: No    Sexual activity: Not on file   Other Topics Concern    Are you pregnant or think you may be? No    Breast-feeding No   Social History Narrative    Not on file       Surgery :  Bilateral Knee Surgery; Bilateral Cataract surgery; Bilateral Mastectomy. Cholecystectomy;  C;Spine surgery; Hysterectomy. Colonoscopy; Pacemaker    Symptoms :    Review of Systems   Constitutional: Negative for chills, diaphoresis, fever, malaise/fatigue and weight loss.   HENT: Negative for congestion, ear discharge, ear pain, hearing loss, nosebleeds, sinus pain, sore throat and tinnitus.    Eyes: Negative for blurred vision, double vision, photophobia, pain, discharge and redness.        Under  Care of Eye doctor for Glaucoma and Macular degeneration   Respiratory: Positive for shortness of breath (TORO). Negative for cough, hemoptysis, sputum production, wheezing and stridor.    Cardiovascular: Positive for orthopnea (TORO). Negative for chest pain, palpitations, claudication, leg swelling and PND.   Gastrointestinal: Positive for constipation. Negative for abdominal pain, blood in stool, diarrhea, heartburn, nausea and vomiting.   Genitourinary: Negative for dysuria, flank pain, frequency, hematuria and  urgency.   Musculoskeletal: Positive for back pain and joint pain. Negative for falls.        Rotator Cuff oroblems   Skin: Negative for itching and rash.   Neurological: Negative for dizziness, tremors, sensory change, speech change, focal weakness, seizures, loss of consciousness, weakness and headaches.   Endo/Heme/Allergies: Negative for polydipsia. Does not bruise/bleed easily.   Psychiatric/Behavioral: Positive for depression (Yes). Negative for hallucinations, memory loss, substance abuse and suicidal ideas. The patient is not nervous/anxious and does not have insomnia.        Physical Exam :  Nurse Staci present in the room.    Physical Exam   Constitutional: She is oriented to person, place, and time and well-developed, well-nourished, and in no distress. No distress.   HENT:   Head: Normocephalic and atraumatic.   Right Ear: External ear normal.   Left Ear: External ear normal.   Nose: Nose normal.   Mouth/Throat: Oropharynx is clear and moist.   Eyes: Conjunctivae, EOM and lids are normal. Lids are everted and swept, no foreign bodies found. Pupils are equal.       Neck: Trachea normal and normal range of motion. Normal carotid pulses and no hepatojugular reflux present. No tracheal tenderness present. Carotid bruit is not present. No tracheal deviation present. No thyroid mass and no thyromegaly present.   Cardiovascular: Normal rate, normal heart sounds and normal pulses. An irregularly irregular rhythm present. PMI is not displaced.   Pulmonary/Chest: Effort normal and breath sounds normal. No stridor.       Abdominal: Soft. Normal appearance, normal aorta and bowel sounds are normal. She exhibits no distension and no mass. There is no hepatosplenomegaly, splenomegaly or hepatomegaly. There is no tenderness. There is no CVA tenderness. No hernia.   Genitourinary:   Genitourinary Comments: Not Examined   Musculoskeletal: Normal range of motion. She exhibits edema (1+ edema both legs).    Lymphadenopathy:        Head (right side): No submental, no submandibular, no tonsillar, no preauricular, no posterior auricular and no occipital adenopathy present.        Head (left side): No submental, no submandibular, no tonsillar, no preauricular, no posterior auricular and no occipital adenopathy present.     She has no cervical adenopathy.     She has no axillary adenopathy.        Right: No inguinal, no supraclavicular and no epitrochlear adenopathy present.        Left: No inguinal, no supraclavicular and no epitrochlear adenopathy present.   Neurological: She is alert and oriented to person, place, and time. She has normal motor skills, normal sensation, normal strength, normal reflexes and intact cranial nerves. Gait normal. GCS score is 15.   Skin: Skin is warm, dry and intact. No rash noted. She is not diaphoretic. No cyanosis. Nails show no clubbing.   Psychiatric: Mood, memory, affect and judgment normal.         Labs & Imaging : 01/24/18Hgb 11 to 9.9; Hct 35 to 33.3 Plts 318,000 ANC 4.200. Cr.1.3; eGFR 38. Bili 0.5 Ca 9.9 TSH 3.048  05/29/18 :  Hgb 10.2; Hct 32.7; MCV 85. Platelets 205,000 ANC 3,200   Cr.1.6; Hgb 10.1 Bili 0.6 ALP 59 eGFR 29.8  08/22/18 : NFBS 139;  Cr.1.40; eGFR 35; Ca 10.3; Bili 0.8; ALP 58.   Hgb 9.6; Hct 31.2; MCV 83; Plts 206,000 ANC 3,700    Dx :  Bilateral Breast Cancer in remission. CKD 3. Anemia of Kidney disease      Assessment & Plan: Discussed with Patient. Does not meet Criteria for Procrit therapy. Monitor Labs.  Followup with  nephrologist . RTC 3 months

## 2018-08-29 ENCOUNTER — OFFICE VISIT (OUTPATIENT)
Dept: HEMATOLOGY/ONCOLOGY | Facility: CLINIC | Age: 83
End: 2018-08-29
Payer: MEDICARE

## 2018-08-29 VITALS
DIASTOLIC BLOOD PRESSURE: 59 MMHG | OXYGEN SATURATION: 96 % | WEIGHT: 161.19 LBS | TEMPERATURE: 98 F | HEART RATE: 72 BPM | BODY MASS INDEX: 24.5 KG/M2 | SYSTOLIC BLOOD PRESSURE: 117 MMHG

## 2018-08-29 DIAGNOSIS — D63.1 ANEMIA OF CHRONIC RENAL FAILURE, STAGE 3 (MODERATE): ICD-10-CM

## 2018-08-29 DIAGNOSIS — N18.30 CHRONIC KIDNEY DISEASE, STAGE III (MODERATE): ICD-10-CM

## 2018-08-29 DIAGNOSIS — Z17.0 CARCINOMA OF LEFT BREAST IN FEMALE, ESTROGEN RECEPTOR POSITIVE, UNSPECIFIED SITE OF BREAST: ICD-10-CM

## 2018-08-29 DIAGNOSIS — N18.30 ANEMIA OF CHRONIC RENAL FAILURE, STAGE 3 (MODERATE): ICD-10-CM

## 2018-08-29 DIAGNOSIS — C50.912 CARCINOMA OF LEFT BREAST IN FEMALE, ESTROGEN RECEPTOR POSITIVE, UNSPECIFIED SITE OF BREAST: ICD-10-CM

## 2018-08-29 DIAGNOSIS — C50.911 CARCINOMA OF RIGHT FEMALE BREAST, UNSPECIFIED ESTROGEN RECEPTOR STATUS, UNSPECIFIED SITE OF BREAST: Primary | ICD-10-CM

## 2018-08-29 PROCEDURE — 99999 PR PBB SHADOW E&M-EST. PATIENT-LVL III: CPT | Mod: PBBFAC,,, | Performed by: INTERNAL MEDICINE

## 2018-08-29 PROCEDURE — 99213 OFFICE O/P EST LOW 20 MIN: CPT | Mod: PBBFAC | Performed by: INTERNAL MEDICINE

## 2018-08-29 PROCEDURE — 99213 OFFICE O/P EST LOW 20 MIN: CPT | Mod: S$PBB,,, | Performed by: INTERNAL MEDICINE

## 2018-08-29 RX ORDER — LOSARTAN POTASSIUM 50 MG/1
TABLET ORAL
COMMUNITY
Start: 2018-08-20 | End: 2019-02-05 | Stop reason: SDUPTHER

## 2018-09-18 ENCOUNTER — OFFICE VISIT (OUTPATIENT)
Dept: FAMILY MEDICINE | Facility: CLINIC | Age: 83
End: 2018-09-18
Payer: MEDICARE

## 2018-09-18 VITALS
SYSTOLIC BLOOD PRESSURE: 112 MMHG | HEART RATE: 72 BPM | HEIGHT: 70 IN | OXYGEN SATURATION: 97 % | DIASTOLIC BLOOD PRESSURE: 62 MMHG | WEIGHT: 153 LBS | TEMPERATURE: 98 F | BODY MASS INDEX: 21.9 KG/M2

## 2018-09-18 DIAGNOSIS — M54.5 CHRONIC BILATERAL LOW BACK PAIN, WITH SCIATICA PRESENCE UNSPECIFIED: ICD-10-CM

## 2018-09-18 DIAGNOSIS — I10 ESSENTIAL HYPERTENSION: ICD-10-CM

## 2018-09-18 DIAGNOSIS — I27.20 PULMONARY HYPERTENSION: ICD-10-CM

## 2018-09-18 DIAGNOSIS — G89.29 CHRONIC BILATERAL LOW BACK PAIN, WITH SCIATICA PRESENCE UNSPECIFIED: ICD-10-CM

## 2018-09-18 DIAGNOSIS — I50.9 CONGESTIVE HEART FAILURE, UNSPECIFIED HF CHRONICITY, UNSPECIFIED HEART FAILURE TYPE: Primary | ICD-10-CM

## 2018-09-18 DIAGNOSIS — D68.9 COAGULOPATHY: ICD-10-CM

## 2018-09-18 DIAGNOSIS — E11.22 CONTROLLED TYPE 2 DIABETES MELLITUS WITH STAGE 3 CHRONIC KIDNEY DISEASE, WITHOUT LONG-TERM CURRENT USE OF INSULIN: ICD-10-CM

## 2018-09-18 DIAGNOSIS — N18.30 ANEMIA OF CHRONIC RENAL FAILURE, STAGE 3 (MODERATE): ICD-10-CM

## 2018-09-18 DIAGNOSIS — N18.30 CONTROLLED TYPE 2 DIABETES MELLITUS WITH STAGE 3 CHRONIC KIDNEY DISEASE, WITHOUT LONG-TERM CURRENT USE OF INSULIN: ICD-10-CM

## 2018-09-18 DIAGNOSIS — D63.1 ANEMIA OF CHRONIC RENAL FAILURE, STAGE 3 (MODERATE): ICD-10-CM

## 2018-09-18 DIAGNOSIS — I48.20 CHRONIC ATRIAL FIBRILLATION: ICD-10-CM

## 2018-09-18 DIAGNOSIS — N18.30 STAGE 3 CHRONIC KIDNEY DISEASE: ICD-10-CM

## 2018-09-18 PROCEDURE — 99215 OFFICE O/P EST HI 40 MIN: CPT | Mod: S$PBB,,, | Performed by: INTERNAL MEDICINE

## 2018-09-18 PROCEDURE — 99999 PR PBB SHADOW E&M-EST. PATIENT-LVL III: CPT | Mod: PBBFAC,,, | Performed by: INTERNAL MEDICINE

## 2018-09-18 PROCEDURE — 99213 OFFICE O/P EST LOW 20 MIN: CPT | Mod: PBBFAC,PO | Performed by: INTERNAL MEDICINE

## 2018-09-18 RX ORDER — TRAMADOL HYDROCHLORIDE 50 MG/1
TABLET ORAL
Qty: 180 TABLET | Refills: 5 | Status: SHIPPED | OUTPATIENT
Start: 2018-09-18 | End: 2019-02-05 | Stop reason: SDUPTHER

## 2018-09-18 RX ORDER — GABAPENTIN 100 MG/1
CAPSULE ORAL
Qty: 180 CAPSULE | Refills: 12 | Status: SHIPPED | OUTPATIENT
Start: 2018-09-18 | End: 2019-02-05 | Stop reason: SDUPTHER

## 2018-09-18 NOTE — PROGRESS NOTES
Chief complaint follow-up from the hospital    82-year-old white female about 1 week ago developed fluid retention, edema and shortness of breath.  She was on diuretics.  She has been discharged back on the same Demadex 20 mg a day with probably metolazone 3 times a week.  She did increase that medication even prior to going into the hospital without any diuresis.  She went into the hospital with a weight of 163 and was discharged at 144.  At home she is slightly increasing her weight back up to 148 and today she is 152.  When I review prior office visits when asymptomatic she was around 148-150 so she may be returning to baseline.  She will continue to monitor.  She has an appointment with her outside cardiologist today.  She has been doing daily weights which is new.  She has no current edema or dyspnea on exertion which she did have it about 5 steps previously.  She apparently was told she did not have a week cart or heart attack.  It is unclear at this point exactly what type of congestive heart failure she might have had.  She is not aware that it is right-sided heart failure but she does apparently have underlying pulmonary hypertension.  I reviewed outside records with an A1c of 5.8, BNP of 4157 and a GFR of 35.  Renal function appears to be back to her usual baseline.    Prior to this about 6 weeks ago both of her sons had birthdays and she did eat a lot of salty food at a seafood restaurant.  This may have been a precipitating factor    Patient also needs a refill of her tramadol.  She essentially only takes 1 per night so the supply of 180 is actually a 6 month supply.      All these issues reviewed and patient counseled and we went over her medications and labs in detail today together.Total time over 25 minutes with over 50% counseling.            ROS:   CONST: weight slowly rising EYES: no vision change. ENT: no sore throat. CV: no chest pain w/ exertion. RESP: no shortness of breath. GI: no nausea,  vomiting, diarrhea. No dysphagia. : no urinary issues. MUSCULOSKELETAL: no new myalgias or arthralgias. SKIN: no new changes. NEURO: no focal deficits. PSYCH: no new issues. ENDOCRINE: no polyuria. HEME: no lymph nodes. ALLERGY: no general pruritis.                                               PAST MEDICAL HISTORY:                                                        1.  Atrial [facility], [facility]                     2.  Breast cancer and right mastectomy, left mastectomy  sees Dr. Altamirano.                                                 3.  Total hysterectomy.                                                      4.  Tonsillectomy.                                                           5.  Appendectomy.                                                            6.  Bilateral knee replacements.                                             7.  Asthma.                                                                  8.  Hypertension.                                                            9.  Hyperlipidemia.                                                          10.  Gout.                                                                   11.  Numerous colonoscopies, all normal. Dr. Kohli, repeats every 5 years                                                  12.  DIABETES with A1c up to 6.6                                       13.  Normal-to-elevated bone density .  14.  Lumbar laminectomy  -Dr Martinez   15.  Congestive heart failure 2018, unknown CHF type  16.  Pulmonary hypertension, on t.i.d. Viagra 20 mg                                                                                                               FAMILY HISTORY:  Father  at 45 of stomach cancer.  Mom  at 73 with   colon cancer, but of natural causes.  Two brothers with colon cancer.  Two   sisters; one with asthma, one  of heart failure.                                                                                                       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  Never smoked, occasional alcohol.  She is          , but still lives with her two sons.      Gen: no distress  EYES: conjunctiva clear, non-icteric, PERRL  ENT: nose clear, nasal mucosa normal, oropharynx clear and moist, teeth good  NECK:supple, thyroid non-palpable  RESP: effort is good, lungs clear  CV: heart RRR w/o murmur, gallops or rubs; no carotid bruits, trace pretibial edema  GI: abdomen soft, non-distended, non-tender, no hepatosplenomegaly  MS: gait normal, no clubbing or cyanosis of the digits  SKIN: no rashes, warm to touch    Outside records reviewed from recent hospitalization as well as internal labs and x-ray reports and cardiac testing    Lexy was seen today for hospital follow up.    Diagnoses and all orders for this visit:    Congestive heart failure, unspecified HF chronicity, unspecified heart failure type, patient encouraged to get specific explanation from her cardiologist today as to the type of CHF.  We discussed monitoring weight and that she may be returning to her baseline which needs to be established    Anemia of chronic renal failure, stage 3 (moderate) appears to have returned to baseline    Essential hypertension, chronic and stable    Chronic atrial fibrillation, appears to be in sinus rhythm today and apparently her CHF was not related to rapid response    Controlled type 2 diabetes mellitus with stage 3 chronic kidney disease, without long-term current use of insulin, A1c of 5.8    Pulmonary hypertension, keep follow-up with Cardiology    Coagulopathy, on anticoagulant    Stage 3 chronic kidney disease    Chronic bilateral low back pain, with sciatica presence unspecified, chronic and stable use of gabapentin and tramadol    Other orders  -     traMADol (ULTRAM) 50 mg tablet; 1-2 q 6hr prn  -     gabapentin  (NEURONTIN) 100 MG capsule; 2 HS

## 2018-10-10 ENCOUNTER — OFFICE VISIT (OUTPATIENT)
Dept: PODIATRY | Facility: CLINIC | Age: 83
End: 2018-10-10
Payer: MEDICARE

## 2018-10-10 ENCOUNTER — HOSPITAL ENCOUNTER (OUTPATIENT)
Dept: RADIOLOGY | Facility: HOSPITAL | Age: 83
Discharge: HOME | End: 2018-10-10
Attending: PODIATRIST
Payer: MEDICARE

## 2018-10-10 VITALS — BODY MASS INDEX: 21.76 KG/M2 | WEIGHT: 152 LBS | HEIGHT: 70 IN

## 2018-10-10 DIAGNOSIS — L97.521 FOOT ULCERATION, LEFT, LIMITED TO BREAKDOWN OF SKIN: Primary | ICD-10-CM

## 2018-10-10 DIAGNOSIS — E11.22 CONTROLLED TYPE 2 DIABETES MELLITUS WITH STAGE 3 CHRONIC KIDNEY DISEASE, WITHOUT LONG-TERM CURRENT USE OF INSULIN: ICD-10-CM

## 2018-10-10 DIAGNOSIS — N18.30 CONTROLLED TYPE 2 DIABETES MELLITUS WITH STAGE 3 CHRONIC KIDNEY DISEASE, WITHOUT LONG-TERM CURRENT USE OF INSULIN: ICD-10-CM

## 2018-10-10 DIAGNOSIS — L97.521 FOOT ULCERATION, LEFT, LIMITED TO BREAKDOWN OF SKIN: ICD-10-CM

## 2018-10-10 PROCEDURE — 73630 X-RAY EXAM OF FOOT: CPT | Mod: 26,LT,, | Performed by: RADIOLOGY

## 2018-10-10 PROCEDURE — 99214 OFFICE O/P EST MOD 30 MIN: CPT | Mod: PBBFAC,25,PO | Performed by: PODIATRIST

## 2018-10-10 PROCEDURE — 99999 PR PBB SHADOW E&M-EST. PATIENT-LVL IV: CPT | Mod: PBBFAC,,, | Performed by: PODIATRIST

## 2018-10-10 PROCEDURE — 99214 OFFICE O/P EST MOD 30 MIN: CPT | Mod: S$PBB,,, | Performed by: PODIATRIST

## 2018-10-10 PROCEDURE — 73630 X-RAY EXAM OF FOOT: CPT | Mod: TC,FY,PO,LT

## 2018-10-10 RX ORDER — AMOXICILLIN AND CLAVULANATE POTASSIUM 875; 125 MG/1; MG/1
1 TABLET, FILM COATED ORAL EVERY 12 HOURS
Qty: 20 TABLET | Refills: 0 | Status: SHIPPED | OUTPATIENT
Start: 2018-10-10 | End: 2018-12-05

## 2018-10-10 NOTE — PROGRESS NOTES
Subjective:      Patient ID: Lexy Conteh is a 82 y.o. female.    Chief Complaint: Foot Pain (left great toe pain (PCP Dr Mathis)); Foot Problem; and Diabetes Mellitus    Lexy is a 82 y.o. female who presents to the clinic for evaluation and treatment of high risk feet. Lexy has a past medical history of Allergic rhinitis, seasonal (10/7/2015), Allergy, Anemia, Anemia (10/17/2013), Arthritis of hand (12/11/2012), Atrial fibrillation (9/7/2012), Blood clotting tendency, Breast cancer, Chronic LBP (4/28/2015), CKD (chronic kidney disease) (6/30/2015), Diabetes mellitus type II, uncontrolled (12/11/2012), Diabetes mellitus with renal manifestations, controlled, Family history of colon cancer (10/7/2015), Fever blister, HTN (hypertension) (9/7/2012), Hyperlipemia, Hyperlipidemia (12/11/2012), Intrinsic asthma, unspecified (12/11/2012), Keloid cicatrix, Pacemaker, and Squamous Cell Carcinoma (7/2013). The patient's chief complaint is diabetic foot ulcer, Reports attempting to trim her left great toenail and caused a wound x 3 weeks.  Reports continued pain to left foot. Has been applying triple abx.     PCP: Ronny Mathis MD    Date Last Seen by PCP: 9/18/18    Current shoe gear:  Affected Foot: Tennis shoes     Unaffected Foot: Tennis shoes    History of Trauma: positive      Hemoglobin A1C   Date Value Ref Range Status   01/24/2018 5.8 (H) 4.0 - 5.6 % Final     Comment:     According to ADA guidelines, hemoglobin A1c <7.0% represents  optimal control in non-pregnant diabetic patients. Different  metrics may apply to specific patient populations.   Standards of Medical Care in Diabetes-2016.  For the purpose of screening for the presence of diabetes:  <5.7%     Consistent with the absence of diabetes  5.7-6.4%  Consistent with increasing risk for diabetes   (prediabetes)  >or=6.5%  Consistent with diabetes  Currently, no consensus exists for use of hemoglobin A1c  for diagnosis of diabetes for  children.  This Hemoglobin A1c assay has significant interference with fetal   hemoglobin   (HbF). The results are invalid for patients with abnormal amounts of   HbF,   including those with known Hereditary Persistence   of Fetal Hemoglobin. Heterozygous hemoglobin variants (HbAS, HbAC,   HbAD, HbAE, HbA2) do not significantly interfere with this assay;   however, presence of multiple variants in a sample may impact the %   interference.     10/25/2017 6.1 (H) 4.0 - 5.6 % Final     Comment:     According to ADA guidelines, hemoglobin A1c <7.0% represents  optimal control in non-pregnant diabetic patients. Different  metrics may apply to specific patient populations.   Standards of Medical Care in Diabetes-2016.  For the purpose of screening for the presence of diabetes:  <5.7%     Consistent with the absence of diabetes  5.7-6.4%  Consistent with increasing risk for diabetes   (prediabetes)  >or=6.5%  Consistent with diabetes  Currently, no consensus exists for use of hemoglobin A1c  for diagnosis of diabetes for children.  This Hemoglobin A1c assay has significant interference with fetal   hemoglobin   (HbF). The results are invalid for patients with abnormal amounts of   HbF,   including those with known Hereditary Persistence   of Fetal Hemoglobin. Heterozygous hemoglobin variants (HbAS, HbAC,   HbAD, HbAE, HbA2) do not significantly interfere with this assay;   however, presence of multiple variants in a sample may impact the %   interference.     05/19/2017 6.6 (H) 4.5 - 6.2 % Final     Comment:     According to ADA guidelines, hemoglobin A1C <7.0% represents  optimal control in non-pregnant diabetic patients.  Different  metrics may apply to specific populations.   Standards of Medical Care in Diabetes - 2016.  For the purpose of screening for the presence of diabetes:  <5.7%     Consistent with the absence of diabetes  5.7-6.4%  Consistent with increasing risk for diabetes   (prediabetes)  >or=6.5%   Consistent with diabetes  Currently no consensus exists for use of hemoglobin A1C  for diagnosis of diabetes for children.         Review of Systems   Constitution: Negative for chills, diaphoresis, fever and weakness.   Cardiovascular: Negative for claudication, cyanosis, leg swelling and syncope.   Respiratory: Negative for cough and shortness of breath.    Skin: Positive for color change, nail changes and suspicious lesions.   Musculoskeletal: Negative for falls, joint pain, muscle cramps and muscle weakness.   Gastrointestinal: Negative for diarrhea, nausea and vomiting.   Neurological: Positive for paresthesias. Negative for disturbances in coordination, numbness, sensory change and tremors.   Psychiatric/Behavioral: Negative for altered mental status.           Objective:      Physical Exam   Constitutional: She appears well-developed. She is cooperative.   Oriented to time, place, and person.   Cardiovascular:   DP and PT pulses are palpable bilaterally. 3 sec capillary refill time and toes and feet are warm to touch proximally .  There is  hair growth on the feet and toes b/l. There is no edema b/l. No spider veins or varicosities present b/l.      Musculoskeletal:   Equinus noted b/l ankles with < 10 deg DF noted. MMT 5/5 in DF/PF/Inv/Ev resistance with no reproduction of pain in any direction. Passive range of motion of ankle and pedal joints is painless b/l.     Feet:   Right Foot:   Skin Integrity: Negative for callus or dry skin.   Left Foot:   Skin Integrity: Negative for callus or dry skin.   Lymphadenopathy:   Negative lymphadenopathy bilateral popliteal fossa and tarsal tunnel.   Neurological: She is alert.   Light touch, proprioception, and sharp/dull sensation are all intact bilaterally. Protective threshold with the [place]ienstein monofilament is intact bilaterally.    Skin:   No open lesions, lacerations or wounds noted.Interdigital spaces clean, dry and intact b/l.       Wound to nail bed  left great toe medial border. Erythema noted, no purulent drainage noted. Nail absent to left great toe      Psychiatric: She has a normal mood and affect.     10/10/18                Assessment:       Encounter Diagnoses   Name Primary?    Foot ulceration, left, limited to breakdown of skin Yes    Controlled type 2 diabetes mellitus with stage 3 chronic kidney disease, without long-term current use of insulin          Plan:       Lexy was seen today for foot pain, foot problem and diabetes mellitus.    Diagnoses and all orders for this visit:    Foot ulceration, left, limited to breakdown of skin  -     X-Ray Foot Complete Left; Future  -     Ankle Brachial Indices WO Stress W Toe Tracings ([facility]UP[facility] ONLY-[facility],[place],[facility]); Future    Controlled type 2 diabetes mellitus with stage 3 chronic kidney disease, without long-term current use of insulin    Other orders  -     amoxicillin-clavulanate 875-125mg (AUGMENTIN) 875-125 mg per tablet; Take 1 tablet by mouth every 12 (twelve) hours.      I counseled the patient on her conditions, their implications and medical management.    With patient's permission curette used to inspect for nail remnants left great toe.     Augmentin prescribed sent to pharmacy.     Decreased pedal pulses MIGUEL A ordered     Painted with betadine covered with band aid.     Left foot xray to assess underlying deformity and for underlying osseous pathology.     Follow-up:Patient is to return to the clinic in one week for follow-up but should call [facility] immediately if any signs of infection, such as fever, chills, sweats, increased redness or pain.    Sun Hernandez DPM

## 2018-10-24 ENCOUNTER — OFFICE VISIT (OUTPATIENT)
Dept: PODIATRY | Facility: CLINIC | Age: 83
End: 2018-10-24
Payer: MEDICARE

## 2018-10-24 VITALS — BODY MASS INDEX: 21.76 KG/M2 | WEIGHT: 152 LBS | HEIGHT: 70 IN

## 2018-10-24 DIAGNOSIS — N18.30 CONTROLLED TYPE 2 DIABETES MELLITUS WITH STAGE 3 CHRONIC KIDNEY DISEASE, WITHOUT LONG-TERM CURRENT USE OF INSULIN: Primary | ICD-10-CM

## 2018-10-24 DIAGNOSIS — E11.22 CONTROLLED TYPE 2 DIABETES MELLITUS WITH STAGE 3 CHRONIC KIDNEY DISEASE, WITHOUT LONG-TERM CURRENT USE OF INSULIN: Primary | ICD-10-CM

## 2018-10-24 DIAGNOSIS — Z87.2 HEALED FOOT ULCER: ICD-10-CM

## 2018-10-24 PROCEDURE — 99999 PR PBB SHADOW E&M-EST. PATIENT-LVL IV: CPT | Mod: PBBFAC,,, | Performed by: PODIATRIST

## 2018-10-24 PROCEDURE — 99212 OFFICE O/P EST SF 10 MIN: CPT | Mod: S$PBB,,, | Performed by: PODIATRIST

## 2018-10-24 PROCEDURE — 99214 OFFICE O/P EST MOD 30 MIN: CPT | Mod: PBBFAC,PO | Performed by: PODIATRIST

## 2018-10-28 NOTE — PROGRESS NOTES
Subjective:      Patient ID: Lexy Conteh is a 82 y.o. female.    Chief Complaint: Foot Pain (left toe pain (PCP Dr Mathis))    Lexy is a 82 y.o. female who presents to the clinic for evaluation and treatment of high risk feet. Lexy has a past medical history of Allergic rhinitis, seasonal (10/7/2015), Allergy, Anemia, Anemia (10/17/2013), Arthritis of hand (12/11/2012), Atrial fibrillation (9/7/2012), Blood clotting tendency, Breast cancer, Chronic LBP (4/28/2015), CKD (chronic kidney disease) (6/30/2015), Diabetes mellitus type II, uncontrolled (12/11/2012), Diabetes mellitus with renal manifestations, controlled, Family history of colon cancer (10/7/2015), Fever blister, HTN (hypertension) (9/7/2012), Hyperlipemia, Hyperlipidemia (12/11/2012), Intrinsic asthma, unspecified (12/11/2012), Keloid cicatrix, Pacemaker, and Squamous Cell Carcinoma (7/2013). The patient's chief complaint is diabetic foot ulcer, Reports attempting to trim her left great toenail and caused a wound x 3 weeks.  Reports continued pain to left foot. Has been applying triple abx.     10/24/18: F;u left great toenail wound. Pt. Has completed PO abx. Reports improvement in pain.     PCP: Ronny Mathis MD    Date Last Seen by PCP: 9/18/18    Current shoe gear:  Affected Foot: Tennis shoes     Unaffected Foot: Tennis shoes    History of Trauma: positive      Hemoglobin A1C   Date Value Ref Range Status   01/24/2018 5.8 (H) 4.0 - 5.6 % Final     Comment:     According to ADA guidelines, hemoglobin A1c <7.0% represents  optimal control in non-pregnant diabetic patients. Different  metrics may apply to specific patient populations.   Standards of Medical Care in Diabetes-2016.  For the purpose of screening for the presence of diabetes:  <5.7%     Consistent with the absence of diabetes  5.7-6.4%  Consistent with increasing risk for diabetes   (prediabetes)  >or=6.5%  Consistent with diabetes  Currently, no consensus exists for use of  hemoglobin A1c  for diagnosis of diabetes for children.  This Hemoglobin A1c assay has significant interference with fetal   hemoglobin   (HbF). The results are invalid for patients with abnormal amounts of   HbF,   including those with known Hereditary Persistence   of Fetal Hemoglobin. Heterozygous hemoglobin variants (HbAS, HbAC,   HbAD, HbAE, HbA2) do not significantly interfere with this assay;   however, presence of multiple variants in a sample may impact the %   interference.     10/25/2017 6.1 (H) 4.0 - 5.6 % Final     Comment:     According to ADA guidelines, hemoglobin A1c <7.0% represents  optimal control in non-pregnant diabetic patients. Different  metrics may apply to specific patient populations.   Standards of Medical Care in Diabetes-2016.  For the purpose of screening for the presence of diabetes:  <5.7%     Consistent with the absence of diabetes  5.7-6.4%  Consistent with increasing risk for diabetes   (prediabetes)  >or=6.5%  Consistent with diabetes  Currently, no consensus exists for use of hemoglobin A1c  for diagnosis of diabetes for children.  This Hemoglobin A1c assay has significant interference with fetal   hemoglobin   (HbF). The results are invalid for patients with abnormal amounts of   HbF,   including those with known Hereditary Persistence   of Fetal Hemoglobin. Heterozygous hemoglobin variants (HbAS, HbAC,   HbAD, HbAE, HbA2) do not significantly interfere with this assay;   however, presence of multiple variants in a sample may impact the %   interference.     05/19/2017 6.6 (H) 4.5 - 6.2 % Final     Comment:     According to ADA guidelines, hemoglobin A1C <7.0% represents  optimal control in non-pregnant diabetic patients.  Different  metrics may apply to specific populations.   Standards of Medical Care in Diabetes - 2016.  For the purpose of screening for the presence of diabetes:  <5.7%     Consistent with the absence of diabetes  5.7-6.4%  Consistent with increasing risk  for diabetes   (prediabetes)  >or=6.5%  Consistent with diabetes  Currently no consensus exists for use of hemoglobin A1C  for diagnosis of diabetes for children.         Review of Systems   Constitution: Negative for chills, diaphoresis, fever and weakness.   Cardiovascular: Negative for claudication, cyanosis, leg swelling and syncope.   Respiratory: Negative for cough and shortness of breath.    Skin: Positive for color change, nail changes and suspicious lesions.   Musculoskeletal: Negative for falls, joint pain, muscle cramps and muscle weakness.   Gastrointestinal: Negative for diarrhea, nausea and vomiting.   Neurological: Positive for paresthesias. Negative for disturbances in coordination, numbness, sensory change and tremors.   Psychiatric/Behavioral: Negative for altered mental status.           Objective:      Physical Exam   Constitutional: She appears well-developed. She is cooperative.   Oriented to time, place, and person.   Cardiovascular:   DP and PT pulses are palpable bilaterally. 3 sec capillary refill time and toes and feet are warm to touch proximally .  There is  hair growth on the feet and toes b/l. There is no edema b/l. No spider veins or varicosities present b/l.      Musculoskeletal:   Equinus noted b/l ankles with < 10 deg DF noted. MMT 5/5 in DF/PF/Inv/Ev resistance with no reproduction of pain in any direction. Passive range of motion of ankle and pedal joints is painless b/l.     Feet:   Right Foot:   Skin Integrity: Negative for callus or dry skin.   Left Foot:   Skin Integrity: Negative for callus or dry skin.   Lymphadenopathy:   Negative lymphadenopathy bilateral popliteal fossa and tarsal tunnel.   Neurological: She is alert.   Light touch, proprioception, and sharp/dull sensation are all intact bilaterally. Protective threshold with the [place]ienstein monofilament is intact bilaterally.    Skin:   No open lesions, lacerations or wounds noted.Interdigital spaces clean, dry and  intact b/l.       Healed left hallux ulceration.      Psychiatric: She has a normal mood and affect.     10/24/18              10/10/18                  Assessment:       Encounter Diagnoses   Name Primary?    Controlled type 2 diabetes mellitus with stage 3 chronic kidney disease, without long-term current use of insulin Yes    Healed foot ulcer          Plan:       Lexy was seen today for foot pain.    Diagnoses and all orders for this visit:    Controlled type 2 diabetes mellitus with stage 3 chronic kidney disease, without long-term current use of insulin    Healed foot ulcer      I counseled the patient on her conditions, their implications and medical management.    Wound to left great toe healed. Left foot xray reviewed    - Shoe inspection. Diabetic Foot Education. Patient reminded of the importance of good nutrition and blood sugar control to help prevent podiatric complications of diabetes. Patient instructed on proper foot hygeine. We discussed wearing proper shoe gear, daily foot inspections, never walking without protective shoe gear, caution putting sharp instruments to feet     - Discussed DM foot care:  Wear comfortable, proper fitting shoes. Wash feet daily. Dry well. After drying, apply moisturizer to feet (no lotion to webspaces). Inspect feet daily for skin breaks, blisters, swelling, or redness. Wear cotton socks (preferably white)  Change socks every day. Do NOT walk barefoot. Do NOT use heating pads or warm/hot water soaks     Follow-up:Patient is to return to the clinic in 3 months for follow-up but should call [facility] immediately if any signs of infection, such as fever, chills, sweats, increased redness or pain.    Sun Hernandez DPM

## 2018-11-28 ENCOUNTER — LAB VISIT (OUTPATIENT)
Dept: LAB | Facility: HOSPITAL | Age: 83
End: 2018-11-28
Attending: INTERNAL MEDICINE
Payer: MEDICARE

## 2018-11-28 ENCOUNTER — TELEPHONE (OUTPATIENT)
Dept: NEPHROLOGY | Facility: CLINIC | Age: 83
End: 2018-11-28

## 2018-11-28 DIAGNOSIS — Z17.0 CARCINOMA OF LEFT BREAST IN FEMALE, ESTROGEN RECEPTOR POSITIVE, UNSPECIFIED SITE OF BREAST: ICD-10-CM

## 2018-11-28 DIAGNOSIS — N18.30 CHRONIC KIDNEY DISEASE, STAGE III (MODERATE): ICD-10-CM

## 2018-11-28 DIAGNOSIS — C50.911 CARCINOMA OF RIGHT FEMALE BREAST, UNSPECIFIED ESTROGEN RECEPTOR STATUS, UNSPECIFIED SITE OF BREAST: ICD-10-CM

## 2018-11-28 DIAGNOSIS — D63.1 ANEMIA OF CHRONIC RENAL FAILURE, STAGE 3 (MODERATE): ICD-10-CM

## 2018-11-28 DIAGNOSIS — C50.912 CARCINOMA OF LEFT BREAST IN FEMALE, ESTROGEN RECEPTOR POSITIVE, UNSPECIFIED SITE OF BREAST: ICD-10-CM

## 2018-11-28 DIAGNOSIS — N18.30 ANEMIA OF CHRONIC RENAL FAILURE, STAGE 3 (MODERATE): ICD-10-CM

## 2018-11-28 LAB
ALBUMIN SERPL BCP-MCNC: 3.6 G/DL
ALP SERPL-CCNC: 47 U/L
ALT SERPL W/O P-5'-P-CCNC: 9 U/L
ANION GAP SERPL CALC-SCNC: 9 MMOL/L
AST SERPL-CCNC: 21 U/L
BASOPHILS # BLD AUTO: 0.04 K/UL
BASOPHILS NFR BLD: 0.7 %
BILIRUB SERPL-MCNC: 0.5 MG/DL
BUN SERPL-MCNC: 24 MG/DL
CALCIUM SERPL-MCNC: 10.1 MG/DL
CHLORIDE SERPL-SCNC: 99 MMOL/L
CO2 SERPL-SCNC: 33 MMOL/L
CREAT SERPL-MCNC: 1.4 MG/DL
DIFFERENTIAL METHOD: ABNORMAL
EOSINOPHIL # BLD AUTO: 0.2 K/UL
EOSINOPHIL NFR BLD: 4.1 %
ERYTHROCYTE [DISTWIDTH] IN BLOOD BY AUTOMATED COUNT: 19.3 %
EST. GFR  (AFRICAN AMERICAN): 40.4 ML/MIN/1.73 M^2
EST. GFR  (NON AFRICAN AMERICAN): 35 ML/MIN/1.73 M^2
GLUCOSE SERPL-MCNC: 127 MG/DL
HCT VFR BLD AUTO: 29.4 %
HGB BLD-MCNC: 9.2 G/DL
LYMPHOCYTES # BLD AUTO: 1.3 K/UL
LYMPHOCYTES NFR BLD: 23.8 %
MCH RBC QN AUTO: 25.4 PG
MCHC RBC AUTO-ENTMCNC: 31.3 G/DL
MCV RBC AUTO: 81 FL
MONOCYTES # BLD AUTO: 0.7 K/UL
MONOCYTES NFR BLD: 12.2 %
NEUTROPHILS # BLD AUTO: 3.2 K/UL
NEUTROPHILS NFR BLD: 59.2 %
PLATELET # BLD AUTO: 226 K/UL
PMV BLD AUTO: 9 FL
POTASSIUM SERPL-SCNC: 4.5 MMOL/L
PROT SERPL-MCNC: 7.1 G/DL
RBC # BLD AUTO: 3.62 M/UL
SODIUM SERPL-SCNC: 141 MMOL/L
WBC # BLD AUTO: 5.34 K/UL

## 2018-11-28 PROCEDURE — 36415 COLL VENOUS BLD VENIPUNCTURE: CPT | Mod: PO

## 2018-11-28 PROCEDURE — 80053 COMPREHEN METABOLIC PANEL: CPT

## 2018-11-28 PROCEDURE — 85025 COMPLETE CBC W/AUTO DIFF WBC: CPT | Mod: PO

## 2018-11-28 NOTE — TELEPHONE ENCOUNTER
----- Message from Mallory Calvert sent at 11/28/2018  3:39 PM CST -----  Contact: pt   Pt wants appt at [facility]      WAS FORMER PT OF DR HYLTON       pLEASE CALL [phone]    PLUS PT HAS SOME QUESTTIONS  TO   DOCTOR  PH :    JEFRY [phone]    Spoke to pt and is aware that her appt will be scheduled when the January schedule opens

## 2018-12-05 ENCOUNTER — OFFICE VISIT (OUTPATIENT)
Dept: HEMATOLOGY/ONCOLOGY | Facility: CLINIC | Age: 83
End: 2018-12-05
Payer: MEDICARE

## 2018-12-05 VITALS
HEIGHT: 68 IN | HEART RATE: 87 BPM | OXYGEN SATURATION: 99 % | TEMPERATURE: 97 F | BODY MASS INDEX: 22.72 KG/M2 | WEIGHT: 149.94 LBS | SYSTOLIC BLOOD PRESSURE: 114 MMHG | DIASTOLIC BLOOD PRESSURE: 55 MMHG

## 2018-12-05 DIAGNOSIS — Z17.0 CARCINOMA OF LEFT BREAST IN FEMALE, ESTROGEN RECEPTOR POSITIVE, UNSPECIFIED SITE OF BREAST: ICD-10-CM

## 2018-12-05 DIAGNOSIS — C50.911 CARCINOMA OF RIGHT FEMALE BREAST, UNSPECIFIED ESTROGEN RECEPTOR STATUS, UNSPECIFIED SITE OF BREAST: Primary | ICD-10-CM

## 2018-12-05 DIAGNOSIS — C50.912 CARCINOMA OF LEFT BREAST IN FEMALE, ESTROGEN RECEPTOR POSITIVE, UNSPECIFIED SITE OF BREAST: ICD-10-CM

## 2018-12-05 DIAGNOSIS — D63.1 ANEMIA OF CHRONIC RENAL FAILURE, STAGE 3 (MODERATE): ICD-10-CM

## 2018-12-05 DIAGNOSIS — N18.30 CHRONIC KIDNEY DISEASE, STAGE III (MODERATE): ICD-10-CM

## 2018-12-05 DIAGNOSIS — N18.30 ANEMIA OF CHRONIC RENAL FAILURE, STAGE 3 (MODERATE): ICD-10-CM

## 2018-12-05 PROCEDURE — 99213 OFFICE O/P EST LOW 20 MIN: CPT | Mod: S$PBB,,, | Performed by: INTERNAL MEDICINE

## 2018-12-05 PROCEDURE — 99999 PR PBB SHADOW E&M-EST. PATIENT-LVL III: CPT | Mod: PBBFAC,,, | Performed by: INTERNAL MEDICINE

## 2018-12-05 PROCEDURE — 99213 OFFICE O/P EST LOW 20 MIN: CPT | Mod: PBBFAC | Performed by: INTERNAL MEDICINE

## 2018-12-05 RX ORDER — METHYLPREDNISOLONE 4 MG/1
TABLET ORAL
COMMUNITY
Start: 2018-09-20 | End: 2019-02-05

## 2018-12-05 RX ORDER — FLUCONAZOLE 150 MG/1
TABLET ORAL
Refills: 0 | COMMUNITY
Start: 2018-11-08 | End: 2019-02-05

## 2018-12-05 RX ORDER — CEPHALEXIN 500 MG/1
CAPSULE ORAL
Refills: 0 | COMMUNITY
Start: 2018-11-08 | End: 2019-02-05

## 2018-12-05 NOTE — PROGRESS NOTES
Chief Complaint :  Breast Cancer.    Hx of Present illness :  Patient is a 82 y.o. year old female who presents to the clinic today for Oncology followup. Initial Dx of Cancer in right Breast had Mastectomy. Had Chemotherapy . About Two years later Dx'd to have Left Breast Cancer. Had Mastectomy followed by harmonal therapy. Completed Harmonal therapy about  A year ago.   Had right rotator cuff surgery about 4 weeks ago.      Allergies :    Review of patient's allergies indicates:   Allergen Reactions    Antihistimine Other (See Comments)    Antivert  [meclizine] Rash       Occupation :  Retd from [employer]    Transfusion :  Years ago.    Menstrual & obstetric Hx :  5; Para 4.  Age of menarche: 14  Age of first pregnancy: 19  Lactation history: None  Age of menopause:  After Hysterectomy  HRT:  Yes    Present Meds :  Reviewed.       Medication List           Accurate as of 18  7:51 AM. If you have any questions, ask your nurse or doctor.               CONTINUE taking these medications    allopurinol 300 MG tablet  Commonly known as:  ZYLOPRIM  Take 1 tablet (300 mg total) by mouth once daily. 1 Tablet Oral Every day     amoxicillin-clavulanate 875-125mg 875-125 mg per tablet  Commonly known as:  AUGMENTIN  Take 1 tablet by mouth every 12 (twelve) hours.     dabigatran etexilate 150 mg Cap  Commonly known as:  PRADAXA  Take 1 capsule (150 mg total) by mouth 2 (two) times daily.     desonide 0.05% 0.05 % Oint  Commonly known as:  DESOWEN  AAA face bid     digoxin 250 mcg tablet  Commonly known as:  LANOXIN  Take 1 tablet (250 mcg total) by mouth once daily. 1 Tablet Oral Every day     fluticasone-salmeterol 250-50 mcg/dose 250-50 mcg/dose diskus inhaler  Commonly known as:  ADVAIR DISKUS  Inhale 1 puff into the lungs 2 (two) times daily. 1 Disk with Device Inhalation Twice a day     gabapentin 100 MG capsule  Commonly known as:  NEURONTIN  2 HS     latanoprost 0.005 % ophthalmic solution      lidocaine 5 %  Commonly known as:  LIDODERM  Place 1 patch onto the skin once daily. Remove & Discard patch within 12 hours or as directed by MD     losartan 50 MG tablet  Commonly known as:  COZAAR     metFORMIN 500 MG 24 hr tablet  Commonly known as:  GLUCOPHAGE-XR  Take 1 tablet (500 mg total) by mouth once daily.     metOLazone 2.5 MG tablet  Commonly known as:  ZAROXOLYN  Take 1 tablet (2.5 mg total) by mouth once daily.     multivitamin capsule     OCUVITE ORAL     ranitidine 150 MG capsule  Commonly known as:  ZANTAC  Take 1 capsule (150 mg total) by mouth 2 (two) times daily. 1 Capsule Oral Three times a day     REVATIO 20 mg Tab  Generic drug:  sildenafil     sertraline 50 MG tablet  Commonly known as:  ZOLOFT  Take 1 tablet (50 mg total) by mouth once daily.     simvastatin 20 MG tablet  Commonly known as:  ZOCOR  Take 1 tablet (20 mg total) by mouth every evening.     theophylline 300 MG 12 hr tablet  Commonly known as:  THEODUR  Take 1 tablet (300 mg total) by mouth every 12 (twelve) hours. 1 Tablet Sustained Release 12HR Oral Twice a day     * torsemide 20 MG Tab  Commonly known as:  DEMADEX  Take 1 tablet (20 mg total) by mouth once daily. Take 1 tablet every morning/everyday.     * DEMADEX 10 MG Tab  Generic drug:  torsemide     traMADol 50 mg tablet  Commonly known as:  ULTRAM  1-2 q 6hr prn         * This list has 2 medication(s) that are the same as other medications prescribed for you. Read the directions carefully, and ask your doctor or other care provider to review them with you.                Past Medical Hx :  reviewed    Past Medical Hx :  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    Atrial fibrillation 9/7/2012    Blood clotting tendency     Breast cancer     Chronic LBP 4/28/2015    CKD (chronic kidney disease) 6/30/2015    Diabetes mellitus type II,  uncontrolled 12/11/2012    Diabetes mellitus with renal manifestations, controlled     Family history of colon cancer 10/7/2015    Fever blister     HTN (hypertension) 9/7/2012    Hyperlipemia     Hyperlipidemia 12/11/2012    Intrinsic asthma, unspecified 12/11/2012    Keloid cicatrix     Pacemaker     Squamous Cell Carcinoma 7/2013    right dorsal hand       Travel Hx :  N/A    Immunization :  Immunization History   Administered Date(s) Administered    Td (ADULT) 07/14/2017       Family Hx :  Family History   Problem Relation Age of Onset    Melanoma Neg Hx     Eczema Neg Hx     Psoriasis Neg Hx        Social Hx :  Social History     Socioeconomic History    Marital status:      Spouse name: Not on file    Number of children: Not on file    Years of education: Not on file    Highest education level: Not on file   Social Needs    Financial resource strain: Not on file    Food insecurity - worry: Not on file    Food insecurity - inability: Not on file    Transportation needs - medical: Not on file    Transportation needs - non-medical: Not on file   Occupational History    Occupation: Retired   Tobacco Use    Smoking status: Never Smoker    Smokeless tobacco: Never Used   Substance and Sexual Activity    Alcohol use: No    Drug use: No    Sexual activity: Not on file   Other Topics Concern    Are you pregnant or think you may be? No    Breast-feeding No   Social History Narrative    Not on file       Surgery :   Right rotator Cuff surgery 2018. Bilateral Knee Surgery; Bilateral Cataract surgery; Bilateral Mastectomy. Cholecystectomy;  C;Spine surgery; Hysterectomy. Colonoscopy; Pacemaker    Symptoms :    Review of Systems   Constitutional: Negative for chills, fever, malaise/fatigue and weight loss.   HENT: Positive for congestion and ear discharge. Negative for hearing loss, nosebleeds, sore throat and tinnitus.    Eyes: Negative for blurred vision, double vision, photophobia,  pain, discharge and redness.        Under  Care of Eye doctor for Glaucoma and Macular degeneration. Followed by    Respiratory: Positive for shortness of breath (TORO). Negative for cough, hemoptysis, sputum production and wheezing.    Cardiovascular: Positive for orthopnea (TORO). Negative for chest pain, palpitations, claudication, leg swelling and PND.   Gastrointestinal: Positive for constipation. Negative for abdominal pain, blood in stool, diarrhea, heartburn, nausea and vomiting.   Genitourinary: Negative.  Negative for dysuria, flank pain, frequency, hematuria and urgency.   Musculoskeletal: Positive for back pain and joint pain. Negative for falls.        Rotator Cuff oroblems   Skin: Negative for itching and rash.   Neurological: Positive for tingling (Related to Carpal tunnel). Negative for dizziness, tremors and headaches.   Endo/Heme/Allergies: Negative for polydipsia. Does not bruise/bleed easily.   Psychiatric/Behavioral: Positive for depression (Yes). The patient is not nervous/anxious and does not have insomnia.        Physical Exam :  Son present in the room.    Physical Exam   Constitutional: She is oriented to person, place, and time and well-developed, well-nourished, and in no distress. No distress.   HENT:   Head: Normocephalic and atraumatic.   Right Ear: External ear normal.   Left Ear: External ear normal.   Nose: Nose normal.   Mouth/Throat: Oropharynx is clear and moist.   Eyes: Conjunctivae, EOM and lids are normal. Lids are everted and swept, no foreign bodies found. Pupils are equal.       Neck: Trachea normal and normal range of motion. Normal carotid pulses and no hepatojugular reflux present. No tracheal tenderness present. Carotid bruit is not present. No tracheal deviation present. No thyroid mass and no thyromegaly present.   Cardiovascular: Normal rate, normal heart sounds and normal pulses. An irregularly irregular rhythm present. PMI is not displaced.    Pulmonary/Chest: Effort normal and breath sounds normal. No stridor.       Abdominal: Soft. Normal appearance, normal aorta and bowel sounds are normal. She exhibits no distension and no mass. There is no hepatosplenomegaly, splenomegaly or hepatomegaly. There is no tenderness. There is no CVA tenderness. No hernia.   Genitourinary:   Genitourinary Comments: Not Examined   Musculoskeletal: Normal range of motion. She exhibits edema (1+ edema both legs).        Arms:  Lymphadenopathy:        Head (right side): No submental, no submandibular, no tonsillar, no preauricular, no posterior auricular and no occipital adenopathy present.        Head (left side): No submental, no submandibular, no tonsillar, no preauricular, no posterior auricular and no occipital adenopathy present.     She has no cervical adenopathy.     She has no axillary adenopathy.        Right: No inguinal, no supraclavicular and no epitrochlear adenopathy present.        Left: No inguinal, no supraclavicular and no epitrochlear adenopathy present.   Neurological: She is alert and oriented to person, place, and time. She has normal motor skills, normal sensation, normal strength, normal reflexes and intact cranial nerves. Gait normal. GCS score is 15.   Skin: Skin is warm, dry and intact. No rash noted. She is not diaphoretic. No cyanosis. Nails show no clubbing.   Psychiatric: Mood, memory, affect and judgment normal.         Labs & Imaging : 11/28/18 :  NFBS 127; Cr. 1.4; eGFR 35; Ca 10.1.  Bili 0.5 ALP 63. hgb 9.2; hct 29.4. MCV 81. Plts 226,000 ANC 3,200        Dx :  Bilateral Breast Cancer in remission. CKD 3. Anemia of Kidney disease      Assessment & Plan: Discussed with Patient.  Meets Criteria for Procrit therapy.  Patient & son agreeable Monitor Labs.  Followup with  nephrologist . RTC 3 months

## 2018-12-12 ENCOUNTER — PES CALL (OUTPATIENT)
Dept: ADMINISTRATIVE | Facility: CLINIC | Age: 83
End: 2018-12-12

## 2018-12-17 ENCOUNTER — INFUSION (OUTPATIENT)
Dept: INFUSION THERAPY | Facility: HOSPITAL | Age: 83
End: 2018-12-17
Attending: INTERNAL MEDICINE
Payer: MEDICARE

## 2018-12-17 VITALS
RESPIRATION RATE: 16 BRPM | OXYGEN SATURATION: 96 % | SYSTOLIC BLOOD PRESSURE: 122 MMHG | TEMPERATURE: 98 F | DIASTOLIC BLOOD PRESSURE: 58 MMHG | HEART RATE: 88 BPM

## 2018-12-17 DIAGNOSIS — N18.30 ANEMIA OF CHRONIC RENAL FAILURE, STAGE 3 (MODERATE): Primary | ICD-10-CM

## 2018-12-17 DIAGNOSIS — D63.1 ANEMIA OF CHRONIC RENAL FAILURE, STAGE 3 (MODERATE): Primary | ICD-10-CM

## 2018-12-17 DIAGNOSIS — N18.30 CHRONIC KIDNEY DISEASE, STAGE III (MODERATE): ICD-10-CM

## 2018-12-17 PROCEDURE — 63600175 PHARM REV CODE 636 W HCPCS: Mod: JG | Performed by: INTERNAL MEDICINE

## 2018-12-17 PROCEDURE — 96372 THER/PROPH/DIAG INJ SC/IM: CPT

## 2018-12-17 RX ADMIN — ERYTHROPOIETIN 20000 UNITS: 20000 INJECTION, SOLUTION INTRAVENOUS; SUBCUTANEOUS at 10:12

## 2018-12-17 NOTE — NURSING
Arrived to unit. Pt and son instructed about use and side effects of Procrit. Tolerated injection and monitored post injection. No reactions noted. Pt has next appt. Pt disharged in NAD.

## 2018-12-31 ENCOUNTER — INFUSION (OUTPATIENT)
Dept: INFUSION THERAPY | Facility: HOSPITAL | Age: 83
End: 2018-12-31
Attending: INTERNAL MEDICINE
Payer: MEDICARE

## 2018-12-31 VITALS
OXYGEN SATURATION: 99 % | WEIGHT: 149.94 LBS | DIASTOLIC BLOOD PRESSURE: 56 MMHG | SYSTOLIC BLOOD PRESSURE: 118 MMHG | BODY MASS INDEX: 23.13 KG/M2 | RESPIRATION RATE: 16 BRPM | TEMPERATURE: 98 F | HEART RATE: 68 BPM

## 2018-12-31 DIAGNOSIS — D63.1 ANEMIA OF CHRONIC RENAL FAILURE, STAGE 3 (MODERATE): Primary | ICD-10-CM

## 2018-12-31 DIAGNOSIS — N18.30 CHRONIC KIDNEY DISEASE, STAGE III (MODERATE): ICD-10-CM

## 2018-12-31 DIAGNOSIS — N18.30 ANEMIA OF CHRONIC RENAL FAILURE, STAGE 3 (MODERATE): Primary | ICD-10-CM

## 2018-12-31 PROCEDURE — 63600175 PHARM REV CODE 636 W HCPCS: Mod: JG,EC | Performed by: INTERNAL MEDICINE

## 2018-12-31 PROCEDURE — 96372 THER/PROPH/DIAG INJ SC/IM: CPT

## 2018-12-31 RX ADMIN — ERYTHROPOIETIN 20000 UNITS: 20000 INJECTION, SOLUTION INTRAVENOUS; SUBCUTANEOUS at 09:12

## 2018-12-31 NOTE — PLAN OF CARE
"Problem: Adult Inpatient Plan of Care  Goal: Plan of Care Review  Outcome: Ongoing (interventions implemented as appropriate)  Tolerated Procrit. Reports having felt "jittery" off and on after first injection. Pt instructed to notify Dr. Altamirano if it continues. Pt verbalized understanding. Pt discharged, accompanied by family. NAD.      "

## 2019-01-14 ENCOUNTER — INFUSION (OUTPATIENT)
Dept: INFUSION THERAPY | Facility: HOSPITAL | Age: 84
End: 2019-01-14
Attending: INTERNAL MEDICINE
Payer: MEDICARE

## 2019-01-14 VITALS
DIASTOLIC BLOOD PRESSURE: 60 MMHG | RESPIRATION RATE: 16 BRPM | BODY MASS INDEX: 23.13 KG/M2 | HEART RATE: 76 BPM | OXYGEN SATURATION: 99 % | TEMPERATURE: 98 F | SYSTOLIC BLOOD PRESSURE: 125 MMHG | WEIGHT: 149.94 LBS

## 2019-01-14 DIAGNOSIS — D63.1 ANEMIA OF CHRONIC RENAL FAILURE, STAGE 3 (MODERATE): ICD-10-CM

## 2019-01-14 DIAGNOSIS — D64.9 ANEMIA: Primary | ICD-10-CM

## 2019-01-14 DIAGNOSIS — N18.30 CHRONIC KIDNEY DISEASE, STAGE III (MODERATE): ICD-10-CM

## 2019-01-14 DIAGNOSIS — N18.30 ANEMIA OF CHRONIC RENAL FAILURE, STAGE 3 (MODERATE): ICD-10-CM

## 2019-01-14 LAB
ERYTHROCYTE [DISTWIDTH] IN BLOOD BY AUTOMATED COUNT: 18.5 %
HCT VFR BLD AUTO: 28 %
HGB BLD-MCNC: 8.8 G/DL
MCH RBC QN AUTO: 24.2 PG
MCHC RBC AUTO-ENTMCNC: 31.4 G/DL
MCV RBC AUTO: 77 FL
NEUTROPHILS # BLD AUTO: 2.9 K/UL
PLATELET # BLD AUTO: 202 K/UL
PMV BLD AUTO: 9.4 FL
RBC # BLD AUTO: 3.63 M/UL
WBC # BLD AUTO: 4.89 K/UL

## 2019-01-14 PROCEDURE — 85027 COMPLETE CBC AUTOMATED: CPT

## 2019-01-14 PROCEDURE — 96372 THER/PROPH/DIAG INJ SC/IM: CPT

## 2019-01-14 PROCEDURE — 63600175 PHARM REV CODE 636 W HCPCS: Mod: JG | Performed by: INTERNAL MEDICINE

## 2019-01-14 PROCEDURE — 36415 COLL VENOUS BLD VENIPUNCTURE: CPT

## 2019-01-14 RX ADMIN — ERYTHROPOIETIN 20000 UNITS: 20000 INJECTION, SOLUTION INTRAVENOUS; SUBCUTANEOUS at 09:01

## 2019-01-14 NOTE — PLAN OF CARE
Problem: Adult Inpatient Plan of Care  Goal: Plan of Care Review  Outcome: Ongoing (interventions implemented as appropriate)  Labs drawn. Hbg 8.8 Patient received Procrit injection. Tolerated well. VSS. Received discharge instructions and follow up appointments. Verbalized understanding and ambulated unassisted off unit.

## 2019-01-18 ENCOUNTER — PES CALL (OUTPATIENT)
Dept: ADMINISTRATIVE | Facility: CLINIC | Age: 84
End: 2019-01-18

## 2019-01-28 ENCOUNTER — INFUSION (OUTPATIENT)
Dept: INFUSION THERAPY | Facility: HOSPITAL | Age: 84
End: 2019-01-28
Attending: INTERNAL MEDICINE
Payer: MEDICARE

## 2019-01-28 VITALS — WEIGHT: 149.94 LBS | BODY MASS INDEX: 23.13 KG/M2

## 2019-01-28 DIAGNOSIS — N18.30 CHRONIC KIDNEY DISEASE, STAGE III (MODERATE): ICD-10-CM

## 2019-01-28 DIAGNOSIS — N18.30 ANEMIA OF CHRONIC RENAL FAILURE, STAGE 3 (MODERATE): Primary | ICD-10-CM

## 2019-01-28 DIAGNOSIS — D63.1 ANEMIA OF CHRONIC RENAL FAILURE, STAGE 3 (MODERATE): Primary | ICD-10-CM

## 2019-01-28 PROCEDURE — 63600175 PHARM REV CODE 636 W HCPCS: Mod: JG,EC | Performed by: INTERNAL MEDICINE

## 2019-01-28 PROCEDURE — 96372 THER/PROPH/DIAG INJ SC/IM: CPT

## 2019-01-28 RX ADMIN — ERYTHROPOIETIN 40000 UNITS: 40000 INJECTION, SOLUTION INTRAVENOUS; SUBCUTANEOUS at 10:01

## 2019-01-28 NOTE — PLAN OF CARE
Problem: Adult Inpatient Plan of Care  Goal: Plan of Care Review  Outcome: Ongoing (interventions implemented as appropriate)  Pt presented for Prolia injection. Tolerated well. Pt reports occasional nausea, SOB, fatigue/weakness, and numbness/tingling to L hand (pt has carpal tunnel). Ambulatory into and out of unit. Distress screening tool completed. Future appt information reviewed with pt.

## 2019-02-05 ENCOUNTER — OFFICE VISIT (OUTPATIENT)
Dept: FAMILY MEDICINE | Facility: CLINIC | Age: 84
End: 2019-02-05
Payer: MEDICARE

## 2019-02-05 VITALS
WEIGHT: 160 LBS | HEART RATE: 87 BPM | OXYGEN SATURATION: 96 % | BODY MASS INDEX: 23.7 KG/M2 | HEIGHT: 69 IN | TEMPERATURE: 99 F | SYSTOLIC BLOOD PRESSURE: 112 MMHG | DIASTOLIC BLOOD PRESSURE: 58 MMHG

## 2019-02-05 DIAGNOSIS — F39 MOOD DISORDER: ICD-10-CM

## 2019-02-05 DIAGNOSIS — I27.20 PULMONARY HYPERTENSION: ICD-10-CM

## 2019-02-05 DIAGNOSIS — I50.22 CHRONIC SYSTOLIC CONGESTIVE HEART FAILURE: ICD-10-CM

## 2019-02-05 DIAGNOSIS — E78.5 HYPERLIPIDEMIA, UNSPECIFIED HYPERLIPIDEMIA TYPE: ICD-10-CM

## 2019-02-05 DIAGNOSIS — E11.22 CONTROLLED TYPE 2 DIABETES MELLITUS WITH STAGE 3 CHRONIC KIDNEY DISEASE, WITHOUT LONG-TERM CURRENT USE OF INSULIN: Primary | ICD-10-CM

## 2019-02-05 DIAGNOSIS — F11.20 MODERATE TRAMADOL DEPENDENCE: ICD-10-CM

## 2019-02-05 DIAGNOSIS — D68.9 COAGULOPATHY: ICD-10-CM

## 2019-02-05 DIAGNOSIS — N18.30 CONTROLLED TYPE 2 DIABETES MELLITUS WITH STAGE 3 CHRONIC KIDNEY DISEASE, WITHOUT LONG-TERM CURRENT USE OF INSULIN: Primary | ICD-10-CM

## 2019-02-05 DIAGNOSIS — I50.9 CONGESTIVE HEART FAILURE, UNSPECIFIED HF CHRONICITY, UNSPECIFIED HEART FAILURE TYPE: ICD-10-CM

## 2019-02-05 DIAGNOSIS — I10 ESSENTIAL HYPERTENSION: ICD-10-CM

## 2019-02-05 DIAGNOSIS — M54.5 CHRONIC BILATERAL LOW BACK PAIN, WITH SCIATICA PRESENCE UNSPECIFIED: ICD-10-CM

## 2019-02-05 DIAGNOSIS — J44.9 CHRONIC OBSTRUCTIVE PULMONARY DISEASE, UNSPECIFIED COPD TYPE: ICD-10-CM

## 2019-02-05 DIAGNOSIS — N18.30 ANEMIA OF CHRONIC RENAL FAILURE, STAGE 3 (MODERATE): ICD-10-CM

## 2019-02-05 DIAGNOSIS — I48.20 CHRONIC ATRIAL FIBRILLATION: ICD-10-CM

## 2019-02-05 DIAGNOSIS — D63.1 ANEMIA OF CHRONIC RENAL FAILURE, STAGE 3 (MODERATE): ICD-10-CM

## 2019-02-05 DIAGNOSIS — N18.30 STAGE 3 CHRONIC KIDNEY DISEASE: ICD-10-CM

## 2019-02-05 DIAGNOSIS — G89.29 CHRONIC BILATERAL LOW BACK PAIN, WITH SCIATICA PRESENCE UNSPECIFIED: ICD-10-CM

## 2019-02-05 PROCEDURE — 99215 OFFICE O/P EST HI 40 MIN: CPT | Mod: S$PBB,,, | Performed by: INTERNAL MEDICINE

## 2019-02-05 PROCEDURE — 99215 PR OFFICE/OUTPT VISIT, EST, LEVL V, 40-54 MIN: ICD-10-PCS | Mod: S$PBB,,, | Performed by: INTERNAL MEDICINE

## 2019-02-05 PROCEDURE — 99999 PR PBB SHADOW E&M-EST. PATIENT-LVL III: ICD-10-PCS | Mod: PBBFAC,,, | Performed by: INTERNAL MEDICINE

## 2019-02-05 PROCEDURE — 99213 OFFICE O/P EST LOW 20 MIN: CPT | Mod: PBBFAC,PO | Performed by: INTERNAL MEDICINE

## 2019-02-05 PROCEDURE — 99999 PR PBB SHADOW E&M-EST. PATIENT-LVL III: CPT | Mod: PBBFAC,,, | Performed by: INTERNAL MEDICINE

## 2019-02-05 RX ORDER — SERTRALINE HYDROCHLORIDE 50 MG/1
50 TABLET, FILM COATED ORAL DAILY
Qty: 90 TABLET | Refills: 12 | Status: SHIPPED | OUTPATIENT
Start: 2019-02-05

## 2019-02-05 RX ORDER — LOSARTAN POTASSIUM 50 MG/1
50 TABLET ORAL DAILY
Qty: 90 TABLET | Refills: 12 | Status: SHIPPED | OUTPATIENT
Start: 2019-02-05

## 2019-02-05 RX ORDER — GABAPENTIN 100 MG/1
CAPSULE ORAL
Qty: 180 CAPSULE | Refills: 12 | Status: SHIPPED | OUTPATIENT
Start: 2019-02-05 | End: 2019-04-08 | Stop reason: SDUPTHER

## 2019-02-05 RX ORDER — FLUTICASONE PROPIONATE AND SALMETEROL 250; 50 UG/1; UG/1
1 POWDER RESPIRATORY (INHALATION) 2 TIMES DAILY
Qty: 180 EACH | Refills: 12 | Status: SHIPPED | OUTPATIENT
Start: 2019-02-05

## 2019-02-05 RX ORDER — SILDENAFIL CITRATE 20 MG/1
20 TABLET, FILM COATED ORAL 3 TIMES DAILY
Qty: 270 TABLET | Refills: 12 | Status: SHIPPED | OUTPATIENT
Start: 2019-02-05

## 2019-02-05 RX ORDER — TORSEMIDE 20 MG/1
20 TABLET ORAL DAILY
Qty: 90 TABLET | Refills: 12 | Status: SHIPPED | OUTPATIENT
Start: 2019-02-05

## 2019-02-05 RX ORDER — SIMVASTATIN 20 MG/1
20 TABLET, FILM COATED ORAL NIGHTLY
Qty: 90 TABLET | Refills: 12 | Status: SHIPPED | OUTPATIENT
Start: 2019-02-05 | End: 2019-04-08 | Stop reason: SDUPTHER

## 2019-02-05 RX ORDER — ALLOPURINOL 300 MG/1
300 TABLET ORAL DAILY
Qty: 90 TABLET | Refills: 12 | Status: SHIPPED | OUTPATIENT
Start: 2019-02-05

## 2019-02-05 RX ORDER — TRAMADOL HYDROCHLORIDE 50 MG/1
TABLET ORAL
Qty: 180 TABLET | Refills: 5 | Status: SHIPPED | OUTPATIENT
Start: 2019-02-05 | End: 2019-04-08 | Stop reason: SDUPTHER

## 2019-02-05 RX ORDER — DABIGATRAN ETEXILATE 150 MG/1
150 CAPSULE ORAL 2 TIMES DAILY
Qty: 180 CAPSULE | Refills: 12 | Status: SHIPPED | OUTPATIENT
Start: 2019-02-05 | End: 2019-03-06 | Stop reason: SDUPTHER

## 2019-02-05 NOTE — PROGRESS NOTES
Chief complaint general checkup and refills    83-year-old white female here for her physical although she has Medicare which does not formally cover her physical all her health assessment will be done.  She needs multiple refills.  She recently was in [place] for a CHF exacerbation and fluid overload.  I reviewed those outside records.  She was taken off metformin her A1c this month was 5.7 has been chronically well controlled.  Her GFR is 33 and she does have CHF so explained her she does not need the metformin since she did restart it when she got home.  She was taken off digoxin.  She also follows with an outside Pulmonary for her pulmonary hypertension and COPD.  She does not use her Advair except when she needs to and encouraged her that it is a maintenance medication.  Reviewed her echo showing severe LVH with an EF of 45% some moderate systolic dysfunction.  She has severe pulmonary hypertension.  She is on medications from her pulmonary doctor for this.  She did not bring in all her medications but needs to mall printed to bring to the  base and we discussed my reservations about printing all of them.  We did go through them she is fairly confident about the dosing so I gave her the prescriptions.    Regarding other aspects of health maintenance she follows with oncology.  She has had mastectomies for breast cancer.  She does have a history of colon polyps and does have an appointment to see [facility] in the next day or so as she was with straining having a little bit of thick blood per rectum.  She has increased fiber with resolution of that problem.  We did discuss that with her other medical problems they very likely will not want to pursue another colonoscopy at least at this time with the recent CHF, anticoagulants and so forth.    Now following with [facility] and apparently she is on Procrit.  She has no symptoms of anemia at this time.    Patient also needs a refill of her  tramadol.  She essentially only takes 1 per night so the supply of 180 is actually a 6 month supply.      All these issues reviewed and patient counseled and we went over her medications and labs in detail today together.Total time over 45 minutes with over 50% counseling.            ROS:   CONST: weight slowly rising EYES: no vision change. ENT: no sore throat. CV: no chest pain w/ exertion. RESP: no shortness of breath. GI: no nausea, vomiting, diarrhea. No dysphagia. : no urinary issues. MUSCULOSKELETAL: no new myalgias or arthralgias. SKIN: no new changes. NEURO: no focal deficits. PSYCH: no new issues. ENDOCRINE: no polyuria. HEME: no lymph nodes. ALLERGY: no general pruritis.                                               PAST MEDICAL HISTORY:                                                        1.  Atrial [facility], [facility]                     2.  Breast cancer and right mastectomy, left mastectomy 5/12 sees Dr. Altamirano.                                                 3.  Total hysterectomy.                                                      4.  Tonsillectomy.                                                           5.  Appendectomy.                                                            6.  Bilateral knee replacements.                                             7.  Asthma.                                                                  8.  Hypertension.                                                            9.  Hyperlipidemia.                                                          10.  Gout.                                                                   11.  Numerous colonoscopies, all normal. Dr. Kohli, repeats every 5 years                                                  12.  DIABETES with A1c up to 6.6                                       13.  Normal-to-elevated bone density 2007/2012.  14.  Lumbar laminectomy 2014 -Dr Martinez   15.  Congestive heart  failure 2018, unknown CHF type  16.  Pulmonary hypertension, on t.i.d. Viagra 20 mg    17.  Apparent COPD, sees Pulmonary at [place], Dr. thomas  18.  Severe LVH with moderate LV systolic dysfunction, 45% as of 2019                                                                                                            FAMILY HISTORY:  Father  at 45 of stomach cancer.  Mom  at 73 with   colon cancer, but of natural causes.  Two brothers with colon cancer.  Two   sisters; one with asthma, one  of heart failure.                                                                                                      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  Never smoked, occasional alcohol.  She is          , but still lives with her two sons.      Gen: no distress  EYES: conjunctiva clear, non-icteric, PERRL  ENT: nose clear, nasal mucosa normal, oropharynx clear and moist, teeth good  NECK:supple, thyroid non-palpable  RESP: effort is good, lungs clear  CV: heart irreg w/o murmur, gallops or rubs; no carotid bruits, +1 pretibial edema  GI: abdomen soft, non-distended, non-tender, no hepatosplenomegaly  MS: gait normal, no clubbing or cyanosis of the digits  SKIN: no rashes, warm to touch    [place] was seen today for annual exam.    Diagnoses and all orders for this visit:    Controlled type 2 diabetes mellitus with stage 3 chronic kidney disease, without long-term current use of insulin, good control in with other multiple reasons to be off metformin  -     Hemoglobin A1c; Future  -     Lipid panel; Future    Essential hypertension, chronic and stable, meds refilled    Chronic atrial fibrillation, meds refilled although primarily followed by Cardiology    Anemia of chronic renal failure, stage 3 (moderate), followed by Hematology and Nephrology    Stage 3 chronic kidney disease    Chronic bilateral low back pain, with sciatica  presence unspecified, tramadol refilled    Congestive heart failure, unspecified HF chronicity, unspecified heart failure type    Pulmonary hypertension, follows with Pulmonary    Hyperlipidemia, unspecified hyperlipidemia type, Zocor refilled    Mood disorder, Zoloft refilled    Coagulopathy, Pradaxa refilled    Moderate tramadol dependence    Chronic systolic congestive heart failure, appears to be compensated an on appropriate medications, follow-up with Cardiology    Chronic obstructive pulmonary disease, unspecified COPD type    Other orders  -     traMADol (ULTRAM) 50 mg tablet; 1-2 q 6hr prn  -     simvastatin (ZOCOR) 20 MG tablet; Take 1 tablet (20 mg total) by mouth every evening.  -     sertraline (ZOLOFT) 50 MG tablet; Take 1 tablet (50 mg total) by mouth once daily.  -     ranitidine (ZANTAC) 150 MG capsule; Take 1 capsule (150 mg total) by mouth 2 (two) times daily. 1 Capsule Oral Three times a day  -     gabapentin (NEURONTIN) 100 MG capsule; 2 HS  -     fluticasone-salmeterol 250-50 mcg/dose (ADVAIR DISKUS) 250-50 mcg/dose diskus inhaler; Inhale 1 puff into the lungs 2 (two) times daily. 1 Disk with Device Inhalation Twice a day  -     allopurinol (ZYLOPRIM) 300 MG tablet; Take 1 tablet (300 mg total) by mouth once daily. 1 Tablet Oral Every day  -     losartan (COZAAR) 50 MG tablet; Take 1 tablet (50 mg total) by mouth once daily.  -     dabigatran etexilate (PRADAXA) 150 mg Cap; Take 1 capsule (150 mg total) by mouth 2 (two) times daily.  -     REVATIO 20 mg Tab; Take 1 tablet (20 mg total) by mouth 3 (three) times daily.  -     torsemide (DEMADEX) 20 MG Tab; Take 1 tablet (20 mg total) by mouth once daily. Take 1 tablet every morning/everyday.

## 2019-02-06 ENCOUNTER — OFFICE VISIT (OUTPATIENT)
Dept: NEPHROLOGY | Facility: CLINIC | Age: 84
End: 2019-02-06
Payer: MEDICARE

## 2019-02-06 VITALS
OXYGEN SATURATION: 98 % | WEIGHT: 161.63 LBS | SYSTOLIC BLOOD PRESSURE: 92 MMHG | BODY MASS INDEX: 23.94 KG/M2 | HEIGHT: 69 IN | DIASTOLIC BLOOD PRESSURE: 50 MMHG | HEART RATE: 91 BPM

## 2019-02-06 DIAGNOSIS — N18.30 CHRONIC KIDNEY DISEASE, STAGE III (MODERATE): Primary | ICD-10-CM

## 2019-02-06 PROCEDURE — 99999 PR PBB SHADOW E&M-EST. PATIENT-LVL III: ICD-10-PCS | Mod: PBBFAC,,, | Performed by: INTERNAL MEDICINE

## 2019-02-06 PROCEDURE — 99214 PR OFFICE/OUTPT VISIT, EST, LEVL IV, 30-39 MIN: ICD-10-PCS | Mod: S$PBB,,, | Performed by: INTERNAL MEDICINE

## 2019-02-06 PROCEDURE — 99213 OFFICE O/P EST LOW 20 MIN: CPT | Mod: PBBFAC,PO | Performed by: INTERNAL MEDICINE

## 2019-02-06 PROCEDURE — 99214 OFFICE O/P EST MOD 30 MIN: CPT | Mod: S$PBB,,, | Performed by: INTERNAL MEDICINE

## 2019-02-06 PROCEDURE — 99999 PR PBB SHADOW E&M-EST. PATIENT-LVL III: CPT | Mod: PBBFAC,,, | Performed by: INTERNAL MEDICINE

## 2019-02-12 NOTE — PROGRESS NOTES
Subjective:       Patient ID: Lexy Conteh is a 83 y.o. White female who presents for re-evaluation of progression of Chronic Kidney Disease    HPI  Ms. Conteh is an 83 year old woman with medical history of diabetes, hypertension, breast cancer presenting for follow up of chronic kidney disease.  Patient last seen in Nephrology clinic by Dr. Middleton, July 2018, this is her first visit with me.  Patient reports blood pressure well-controlled, denies any dizziness or light-headedness.  She reports blood sugars well-controlled as well.  She reports adequate fluid intake, denies any NSAID use.  She otherwise denies any fever, chest pain, shortness of breath, abdominal pain, diarrhea, dysuria/hematuria.     Review of Systems   Constitutional: Negative for appetite change, fatigue and fever.   Respiratory: Negative for chest tightness and shortness of breath.    Cardiovascular: Negative for chest pain and leg swelling.   Gastrointestinal: Negative for abdominal pain, constipation, diarrhea, nausea and vomiting.   Genitourinary: Negative for difficulty urinating, dysuria, flank pain, frequency, hematuria and urgency.   Musculoskeletal: Negative for arthralgias, joint swelling and myalgias.   Skin: Negative for rash and wound.   Neurological: Negative for dizziness, weakness and light-headedness.   All other systems reviewed and are negative.      Objective:      Physical Exam   Constitutional: She appears well-developed and well-nourished.   Cardiovascular: Normal rate, regular rhythm and normal heart sounds. Exam reveals no gallop and no friction rub.   No murmur heard.  Pulmonary/Chest: Effort normal and breath sounds normal. No respiratory distress. She has no wheezes. She has no rales.   Abdominal: Soft. Bowel sounds are normal. There is no tenderness.   Musculoskeletal: She exhibits no edema.   Neurological: She is alert.   Skin: Skin is warm and dry. No rash noted. No erythema.   Psychiatric: She has a normal  mood and affect.   Vitals reviewed.      Assessment:       1. Chronic kidney disease, stage III (moderate)        Plan:     Ms. Conteh is an 83 year old woman with medical history of diabetes, hypertension, breast cancer presenting for follow up of chronic kidney disease.  Creatinine mainly 1.2-1.4mg/dL for the past few years, consistent with CKD stage IIIb (likely age-related renal nephron loss, along with diabetic nephropathy).  Patient with minimal proteinuria on ARB, will obtain urine studies to trend proteinuria.  Will repeat renal panel to trend creatinine, will repeat renal US with doppler to rule out obstructive/vascular disease (reviewed renal US 2015).  Stressed importance of blood pressure/glycemic control to prevent any further progression of kidney disease, patient voiced understanding.  Further recommendations pending results of above.    - Anemia: Hgb previously near goal, erythropoetin therapy per Oncology  - Bone/mineral metabolism: will check PTH/VitD    Return to clinic in 4-6 months pending CMP within a couple of weeks, then with renal/heme panel, iron/TIBC/ferritin, urinalysis/culture, urine protein/creatinine ratio, PTH/VitD, renal US prior to next visit

## 2019-02-20 NOTE — TELEPHONE ENCOUNTER
----- Message from Annmariekaci Ruff sent at 2/20/2019  9:12 AM CST -----  Contact: Self  Pt is calling to get test strips for verio machine. Please call pt at [phone]      [facility] - [place] - [address]  [address]  [place] [address]  Phone: [phone] Fax: [phone]

## 2019-02-22 ENCOUNTER — INFUSION (OUTPATIENT)
Dept: INFUSION THERAPY | Facility: HOSPITAL | Age: 84
End: 2019-02-22
Attending: INTERNAL MEDICINE
Payer: MEDICARE

## 2019-02-22 VITALS
WEIGHT: 160.94 LBS | DIASTOLIC BLOOD PRESSURE: 54 MMHG | RESPIRATION RATE: 17 BRPM | OXYGEN SATURATION: 100 % | TEMPERATURE: 97 F | SYSTOLIC BLOOD PRESSURE: 91 MMHG | BODY MASS INDEX: 23.77 KG/M2 | HEART RATE: 69 BPM

## 2019-02-22 DIAGNOSIS — N18.30 CHRONIC KIDNEY DISEASE, STAGE III (MODERATE): ICD-10-CM

## 2019-02-22 DIAGNOSIS — D63.1 ANEMIA OF CHRONIC RENAL FAILURE, STAGE 3 (MODERATE): ICD-10-CM

## 2019-02-22 DIAGNOSIS — D64.9 ANEMIA: Primary | ICD-10-CM

## 2019-02-22 DIAGNOSIS — N18.30 ANEMIA OF CHRONIC RENAL FAILURE, STAGE 3 (MODERATE): ICD-10-CM

## 2019-02-22 LAB
ERYTHROCYTE [DISTWIDTH] IN BLOOD BY AUTOMATED COUNT: 20.3 %
HCT VFR BLD AUTO: 28.6 %
HGB BLD-MCNC: 8.6 G/DL
MCH RBC QN AUTO: 23.1 PG
MCHC RBC AUTO-ENTMCNC: 30.1 G/DL
MCV RBC AUTO: 77 FL
NEUTROPHILS # BLD AUTO: 3.7 K/UL
PLATELET # BLD AUTO: 218 K/UL
PMV BLD AUTO: 9.2 FL
RBC # BLD AUTO: 3.73 M/UL
WBC # BLD AUTO: 5.53 K/UL

## 2019-02-22 PROCEDURE — 85027 COMPLETE CBC AUTOMATED: CPT

## 2019-02-22 PROCEDURE — 96372 THER/PROPH/DIAG INJ SC/IM: CPT

## 2019-02-22 PROCEDURE — 36415 COLL VENOUS BLD VENIPUNCTURE: CPT

## 2019-02-22 PROCEDURE — 63600175 PHARM REV CODE 636 W HCPCS: Mod: JG | Performed by: INTERNAL MEDICINE

## 2019-02-22 RX ADMIN — ERYTHROPOIETIN 40000 UNITS: 20000 INJECTION, SOLUTION INTRAVENOUS; SUBCUTANEOUS at 09:02

## 2019-02-22 NOTE — PLAN OF CARE
Problem: Adult Inpatient Plan of Care  Goal: Plan of Care Review  Outcome: Ongoing (interventions implemented as appropriate)  Arrived to unit. Blood drawn for cbc. Hg 8.6. Tolerated Procrit. Pt has next appt. No complaints voiced. Pt discharged in wheelchair, accompanied by her son.

## 2019-02-26 ENCOUNTER — LAB VISIT (OUTPATIENT)
Dept: LAB | Facility: HOSPITAL | Age: 84
End: 2019-02-26
Attending: INTERNAL MEDICINE
Payer: MEDICARE

## 2019-02-26 DIAGNOSIS — N18.30 CHRONIC KIDNEY DISEASE, STAGE III (MODERATE): ICD-10-CM

## 2019-02-26 DIAGNOSIS — N18.30 ANEMIA OF CHRONIC RENAL FAILURE, STAGE 3 (MODERATE): ICD-10-CM

## 2019-02-26 DIAGNOSIS — Z17.0 CARCINOMA OF LEFT BREAST IN FEMALE, ESTROGEN RECEPTOR POSITIVE, UNSPECIFIED SITE OF BREAST: ICD-10-CM

## 2019-02-26 DIAGNOSIS — D63.1 ANEMIA OF CHRONIC RENAL FAILURE, STAGE 3 (MODERATE): ICD-10-CM

## 2019-02-26 DIAGNOSIS — C50.912 CARCINOMA OF LEFT BREAST IN FEMALE, ESTROGEN RECEPTOR POSITIVE, UNSPECIFIED SITE OF BREAST: ICD-10-CM

## 2019-02-26 DIAGNOSIS — C50.911 CARCINOMA OF RIGHT FEMALE BREAST, UNSPECIFIED ESTROGEN RECEPTOR STATUS, UNSPECIFIED SITE OF BREAST: ICD-10-CM

## 2019-02-26 LAB
ALBUMIN SERPL BCP-MCNC: 3.8 G/DL
ALP SERPL-CCNC: 45 U/L
ALT SERPL W/O P-5'-P-CCNC: 9 U/L
ANION GAP SERPL CALC-SCNC: 11 MMOL/L
AST SERPL-CCNC: 20 U/L
BASOPHILS # BLD AUTO: 0.06 K/UL
BASOPHILS NFR BLD: 1.1 %
BILIRUB SERPL-MCNC: 0.6 MG/DL
BUN SERPL-MCNC: 46 MG/DL
CALCIUM SERPL-MCNC: 9.6 MG/DL
CHLORIDE SERPL-SCNC: 98 MMOL/L
CO2 SERPL-SCNC: 25 MMOL/L
CREAT SERPL-MCNC: 2.7 MG/DL
DIFFERENTIAL METHOD: ABNORMAL
EOSINOPHIL # BLD AUTO: 0.1 K/UL
EOSINOPHIL NFR BLD: 1.2 %
ERYTHROCYTE [DISTWIDTH] IN BLOOD BY AUTOMATED COUNT: 20.7 %
EST. GFR  (AFRICAN AMERICAN): 18.1 ML/MIN/1.73 M^2
EST. GFR  (NON AFRICAN AMERICAN): 15.7 ML/MIN/1.73 M^2
GLUCOSE SERPL-MCNC: 93 MG/DL
HCT VFR BLD AUTO: 27.3 %
HGB BLD-MCNC: 8.2 G/DL
LYMPHOCYTES # BLD AUTO: 1.2 K/UL
LYMPHOCYTES NFR BLD: 20.9 %
MCH RBC QN AUTO: 22.8 PG
MCHC RBC AUTO-ENTMCNC: 30 G/DL
MCV RBC AUTO: 76 FL
MONOCYTES # BLD AUTO: 0.6 K/UL
MONOCYTES NFR BLD: 10.4 %
NEUTROPHILS # BLD AUTO: 3.8 K/UL
NEUTROPHILS NFR BLD: 66.4 %
PLATELET # BLD AUTO: 293 K/UL
PMV BLD AUTO: 9.2 FL
POTASSIUM SERPL-SCNC: 4.4 MMOL/L
PROT SERPL-MCNC: 6.8 G/DL
PTH-INTACT SERPL-MCNC: 55 PG/ML
RBC # BLD AUTO: 3.6 M/UL
SODIUM SERPL-SCNC: 134 MMOL/L
WBC # BLD AUTO: 5.7 K/UL

## 2019-02-26 PROCEDURE — 80053 COMPREHEN METABOLIC PANEL: CPT

## 2019-02-26 PROCEDURE — 83970 ASSAY OF PARATHORMONE: CPT

## 2019-02-26 PROCEDURE — 36415 COLL VENOUS BLD VENIPUNCTURE: CPT | Mod: PO

## 2019-02-26 PROCEDURE — 82306 VITAMIN D 25 HYDROXY: CPT

## 2019-02-26 PROCEDURE — 85025 COMPLETE CBC W/AUTO DIFF WBC: CPT | Mod: PO

## 2019-02-27 LAB — 25(OH)D3+25(OH)D2 SERPL-MCNC: 41 NG/ML

## 2019-03-06 ENCOUNTER — OFFICE VISIT (OUTPATIENT)
Dept: HEMATOLOGY/ONCOLOGY | Facility: CLINIC | Age: 84
End: 2019-03-06
Payer: MEDICARE

## 2019-03-06 ENCOUNTER — INFUSION (OUTPATIENT)
Dept: INFUSION THERAPY | Facility: HOSPITAL | Age: 84
End: 2019-03-06
Attending: INTERNAL MEDICINE
Payer: MEDICARE

## 2019-03-06 VITALS
OXYGEN SATURATION: 96 % | HEIGHT: 68 IN | BODY MASS INDEX: 24.46 KG/M2 | DIASTOLIC BLOOD PRESSURE: 65 MMHG | HEART RATE: 70 BPM | TEMPERATURE: 98 F | WEIGHT: 161.38 LBS | SYSTOLIC BLOOD PRESSURE: 122 MMHG

## 2019-03-06 DIAGNOSIS — D63.1 ANEMIA OF CHRONIC RENAL FAILURE, STAGE 3 (MODERATE): Primary | ICD-10-CM

## 2019-03-06 DIAGNOSIS — N18.9 ANEMIA OF RENAL DISEASE: ICD-10-CM

## 2019-03-06 DIAGNOSIS — N18.30 ANEMIA OF CHRONIC RENAL FAILURE, STAGE 3 (MODERATE): Primary | ICD-10-CM

## 2019-03-06 DIAGNOSIS — N18.4 CHRONIC KIDNEY DISEASE, STAGE IV (SEVERE): ICD-10-CM

## 2019-03-06 DIAGNOSIS — D64.9 ANEMIA: ICD-10-CM

## 2019-03-06 DIAGNOSIS — Z17.0 CARCINOMA OF LEFT BREAST IN FEMALE, ESTROGEN RECEPTOR POSITIVE, UNSPECIFIED SITE OF BREAST: ICD-10-CM

## 2019-03-06 DIAGNOSIS — D63.1 ANEMIA OF RENAL DISEASE: ICD-10-CM

## 2019-03-06 DIAGNOSIS — C50.911 CARCINOMA OF RIGHT FEMALE BREAST, UNSPECIFIED ESTROGEN RECEPTOR STATUS, UNSPECIFIED SITE OF BREAST: Primary | ICD-10-CM

## 2019-03-06 DIAGNOSIS — C50.912 CARCINOMA OF LEFT BREAST IN FEMALE, ESTROGEN RECEPTOR POSITIVE, UNSPECIFIED SITE OF BREAST: ICD-10-CM

## 2019-03-06 PROCEDURE — 99999 PR PBB SHADOW E&M-EST. PATIENT-LVL III: ICD-10-PCS | Mod: PBBFAC,,, | Performed by: INTERNAL MEDICINE

## 2019-03-06 PROCEDURE — 99213 OFFICE O/P EST LOW 20 MIN: CPT | Mod: PBBFAC,25 | Performed by: INTERNAL MEDICINE

## 2019-03-06 PROCEDURE — 96372 THER/PROPH/DIAG INJ SC/IM: CPT

## 2019-03-06 PROCEDURE — 99213 PR OFFICE/OUTPT VISIT, EST, LEVL III, 20-29 MIN: ICD-10-PCS | Mod: S$PBB,,, | Performed by: INTERNAL MEDICINE

## 2019-03-06 PROCEDURE — 99213 OFFICE O/P EST LOW 20 MIN: CPT | Mod: S$PBB,,, | Performed by: INTERNAL MEDICINE

## 2019-03-06 PROCEDURE — 63600175 PHARM REV CODE 636 W HCPCS: Mod: JG,EC | Performed by: INTERNAL MEDICINE

## 2019-03-06 PROCEDURE — 99999 PR PBB SHADOW E&M-EST. PATIENT-LVL III: CPT | Mod: PBBFAC,,, | Performed by: INTERNAL MEDICINE

## 2019-03-06 RX ORDER — FUROSEMIDE 40 MG/1
TABLET ORAL
Refills: 11 | COMMUNITY
Start: 2019-02-20 | End: 2019-03-06 | Stop reason: SDUPTHER

## 2019-03-06 RX ORDER — FUROSEMIDE 40 MG/1
40 TABLET ORAL
COMMUNITY
Start: 2019-02-21 | End: 2019-04-04 | Stop reason: SDUPTHER

## 2019-03-06 RX ORDER — METOPROLOL SUCCINATE 25 MG/1
TABLET, EXTENDED RELEASE ORAL
Refills: 11 | COMMUNITY
Start: 2019-02-20

## 2019-03-06 RX ORDER — DABIGATRAN ETEXILATE MESYLATE 75 MG/1
CAPSULE ORAL
COMMUNITY
Start: 2019-02-22

## 2019-03-06 RX ORDER — AMIODARONE HYDROCHLORIDE 200 MG/1
TABLET ORAL
Refills: 11 | COMMUNITY
Start: 2019-02-20

## 2019-03-06 RX ADMIN — ERYTHROPOIETIN 40000 UNITS: 40000 INJECTION, SOLUTION INTRAVENOUS; SUBCUTANEOUS at 11:03

## 2019-03-06 NOTE — PROGRESS NOTES
Chief Complaint :  Breast Cancer.    Hx of Present illness :  Patient is a 83 y.o. year old female who presents to the clinic today for Oncology followup. Initial Dx of Cancer in right Breast had Mastectomy. Had Chemotherapy . About Two years later Dx'd to have Left Breast Cancer. Had Mastectomy followed by harmonal therapy. Completed Harmonal therapy about  A year ago.   Had right rotator cuff surgery about 4 weeks ago.      Allergies :    Review of patient's allergies indicates:   Allergen Reactions    Antihistimine Other (See Comments)    Antivert  [meclizine] Rash       Occupation :  Retd from [employer]    Transfusion :  Years ago.    Menstrual & obstetric Hx :  5; Para 4.  Age of menarche: 14  Age of first pregnancy: 19  Lactation history: None  Age of menopause:  After Hysterectomy  HRT:  Yes    Present Meds :  Reviewed.    Medication List with Changes/Refills   Current Medications    ALLOPURINOL (ZYLOPRIM) 300 MG TABLET    Take 1 tablet (300 mg total) by mouth once daily. 1 Tablet Oral Every day    BLOOD SUGAR DIAGNOSTIC (VERASENS TEST STRIP) STRP    1 strip by Misc.(Non-Drug; Combo Route) route 2 (two) times daily.    DABIGATRAN ETEXILATE (PRADAXA) 150 MG CAP    Take 1 capsule (150 mg total) by mouth 2 (two) times daily.    FLUTICASONE-SALMETEROL 250-50 MCG/DOSE (ADVAIR DISKUS) 250-50 MCG/DOSE DISKUS INHALER    Inhale 1 puff into the lungs 2 (two) times daily. 1 Disk with Device Inhalation Twice a day    GABAPENTIN (NEURONTIN) 100 MG CAPSULE    2 HS    LATANOPROST 0.005 % OPHTHALMIC SOLUTION        LIDOCAINE (LIDODERM) 5 %    Place 1 patch onto the skin once daily. Remove & Discard patch within 12 hours or as directed by MD    LOSARTAN (COZAAR) 50 MG TABLET    Take 1 tablet (50 mg total) by mouth once daily.    METOLAZONE (ZAROXOLYN) 2.5 MG TABLET    Take 1 tablet (2.5 mg total) by mouth once daily.    MULTIVITAMIN CAPSULE    Take 1 capsule by mouth once daily.    RANITIDINE (ZANTAC) 150  MG CAPSULE    Take 1 capsule (150 mg total) by mouth 2 (two) times daily. 1 Capsule Oral Three times a day    REVATIO 20 MG TAB    Take 1 tablet (20 mg total) by mouth 3 (three) times daily.    SERTRALINE (ZOLOFT) 50 MG TABLET    Take 1 tablet (50 mg total) by mouth once daily.    SIMVASTATIN (ZOCOR) 20 MG TABLET    Take 1 tablet (20 mg total) by mouth every evening.    THEOPHYLLINE (THEODUR) 300 MG 12 HR TABLET    Take 1 tablet (300 mg total) by mouth every 12 (twelve) hours. 1 Tablet Sustained Release 12HR Oral Twice a day    TORSEMIDE (DEMADEX) 20 MG TAB    Take 1 tablet (20 mg total) by mouth once daily. Take 1 tablet every morning/everyday.    TRAMADOL (ULTRAM) 50 MG TABLET    1-2 q 6hr prn    VIT C/VIT E/LUTEIN/MIN/OMEGA-3 (OCUVITE ORAL)    Take by mouth.       Past Medical Hx :  reviewed    Past Medical Hx :  Past Medical History:   Diagnosis Date    Allergic rhinitis, seasonal 10/7/2015    Allergy     seasonal    Anemia     Anemia 10/17/2013    Anxiety     Arthritis of hand 12/11/2012    Atrial fibrillation 9/7/2012    Blood clotting tendency     Breast cancer     Chronic LBP 4/28/2015    CKD (chronic kidney disease) 6/30/2015    Diabetes mellitus type II, uncontrolled 12/11/2012    Diabetes mellitus with renal manifestations, controlled     Family history of colon cancer 10/7/2015    Fever blister     HTN (hypertension) 9/7/2012    Hyperlipemia     Hyperlipidemia 12/11/2012    Intrinsic asthma, unspecified 12/11/2012    Keloid cicatrix     Pacemaker     Squamous Cell Carcinoma 7/2013    right dorsal hand       Travel Hx :  N/A    Immunization :  Immunization History   Administered Date(s) Administered    Td (ADULT) 07/14/2017       Family Hx :  Family History   Problem Relation Age of Onset    Melanoma Neg Hx     Eczema Neg Hx     Psoriasis Neg Hx        Social Hx :  Social History     Socioeconomic History    Marital status:      Spouse name: Not on file    Number of  children: Not on file    Years of education: Not on file    Highest education level: Not on file   Social Needs    Financial resource strain: Not on file    Food insecurity - worry: Not on file    Food insecurity - inability: Not on file    Transportation needs - medical: Not on file    Transportation needs - non-medical: Not on file   Occupational History    Occupation: Retired   Tobacco Use    Smoking status: Never Smoker    Smokeless tobacco: Never Used   Substance and Sexual Activity    Alcohol use: No    Drug use: No    Sexual activity: Not on file   Other Topics Concern    Are you pregnant or think you may be? No    Breast-feeding No   Social History Narrative    Not on file       Surgery :   Right rotator Cuff surgery 2018. Bilateral Knee Surgery; Bilateral Cataract surgery; Bilateral Mastectomy. Cholecystectomy;  C;Spine surgery; Hysterectomy. Colonoscopy; Pacemaker    Symptoms :    Review of Systems   Constitutional: Positive for malaise/fatigue. Negative for chills, fever and weight loss.        Hospitalised for A.Fib and heart failure about three weeks ago. h   HENT: Positive for congestion. Negative for ear discharge, hearing loss, nosebleeds, sore throat and tinnitus.    Eyes: Negative for blurred vision, double vision and photophobia.        Under  Care of Eye doctor for Glaucoma and Macular degeneration. Followed by    Respiratory: Positive for shortness of breath (TORO). Negative for cough, hemoptysis, sputum production and wheezing.    Cardiovascular: Positive for orthopnea (TORO). Negative for chest pain, palpitations, claudication, leg swelling and PND.   Gastrointestinal: Positive for diarrhea (Occasional.). Negative for abdominal pain, blood in stool, constipation, heartburn, nausea and vomiting.        Blood in stools. Waiting to get appt with .    Genitourinary: Negative.  Negative for dysuria, flank pain, frequency, hematuria and urgency.   Musculoskeletal:  Positive for back pain and joint pain. Negative for falls.        Rotator Cuff oroblems   Skin: Negative for itching and rash.   Neurological: Positive for tingling (Related to Carpal tunnel). Negative for dizziness, tremors and headaches.   Endo/Heme/Allergies: Negative for polydipsia. Does not bruise/bleed easily.   Psychiatric/Behavioral: Positive for depression (Yes). The patient has insomnia. The patient is not nervous/anxious.        Physical Exam :  Son present in the room.    Physical Exam   Constitutional: She is oriented to person, place, and time and well-developed, well-nourished, and in no distress. No distress.   HENT:   Head: Normocephalic and atraumatic.   Right Ear: External ear normal.   Left Ear: External ear normal.   Nose: Nose normal.   Mouth/Throat: Oropharynx is clear and moist.   Eyes: Conjunctivae, EOM and lids are normal. Lids are everted and swept, no foreign bodies found. Pupils are equal.       Neck: Trachea normal and normal range of motion. Normal carotid pulses and no hepatojugular reflux present. No tracheal tenderness present. Carotid bruit is not present. No tracheal deviation present. No thyroid mass and no thyromegaly present.   Cardiovascular: Normal rate, normal heart sounds and normal pulses. An irregularly irregular rhythm present. PMI is not displaced.   Pulmonary/Chest: Effort normal and breath sounds normal. No stridor.       Abdominal: Soft. Normal appearance, normal aorta and bowel sounds are normal. She exhibits no distension and no mass. There is no hepatosplenomegaly, splenomegaly or hepatomegaly. There is no tenderness. There is no CVA tenderness. No hernia.   Genitourinary:   Genitourinary Comments: Not Examined   Musculoskeletal: Normal range of motion. She exhibits edema (1+ edema both legs).        Arms:  Lymphadenopathy:        Head (right side): No submental, no submandibular, no tonsillar, no preauricular, no posterior auricular and no occipital adenopathy  present.        Head (left side): No submental, no submandibular, no tonsillar, no preauricular, no posterior auricular and no occipital adenopathy present.     She has no cervical adenopathy.     She has no axillary adenopathy.        Right: No inguinal, no supraclavicular and no epitrochlear adenopathy present.        Left: No inguinal, no supraclavicular and no epitrochlear adenopathy present.   Neurological: She is alert and oriented to person, place, and time. She has normal motor skills, normal sensation, normal strength, normal reflexes and intact cranial nerves. Gait normal. GCS score is 15.   Skin: Skin is warm, dry and intact. No rash noted. She is not diaphoretic. No cyanosis. Nails show no clubbing.   Psychiatric: Mood, memory, affect and judgment normal.         Labs & Imaging : 11/28/18 :  NFBS 127; Cr. 1.4; eGFR 35; Ca 10.1.  Bili 0.5 ALP 63. hgb 9.2; hct 29.4. MCV 81. Plts 226,000 ANC 3,200        Dx :  Bilateral Breast Cancer in remission. CKD 4. Anemia of Kidney disease      Assessment & Plan: Discussed with Patient.    Monitor Labs.  Continue Procrit per guidelines.  Followup with  nephrologist . RTC 3 months

## 2019-03-06 NOTE — PLAN OF CARE
Problem: Adult Inpatient Plan of Care  Goal: Plan of Care Review  Outcome: Ongoing (interventions implemented as appropriate)  Patient tolerated Procrit injection. VSS. Received follow up appointments and ambulated unassisted with family off unit.

## 2019-03-13 ENCOUNTER — OFFICE VISIT (OUTPATIENT)
Dept: NEPHROLOGY | Facility: CLINIC | Age: 84
End: 2019-03-13
Payer: MEDICARE

## 2019-03-13 VITALS
WEIGHT: 166.69 LBS | OXYGEN SATURATION: 99 % | BODY MASS INDEX: 25.26 KG/M2 | HEIGHT: 68 IN | DIASTOLIC BLOOD PRESSURE: 64 MMHG | HEART RATE: 97 BPM | SYSTOLIC BLOOD PRESSURE: 110 MMHG

## 2019-03-13 DIAGNOSIS — N18.30 ANEMIA OF CHRONIC RENAL FAILURE, STAGE 3 (MODERATE): ICD-10-CM

## 2019-03-13 DIAGNOSIS — N17.8 ACUTE RENAL FAILURE WITH SPECIFIED LESION: ICD-10-CM

## 2019-03-13 DIAGNOSIS — N18.30 CHRONIC KIDNEY DISEASE, STAGE III (MODERATE): Primary | ICD-10-CM

## 2019-03-13 DIAGNOSIS — D63.1 ANEMIA OF CHRONIC RENAL FAILURE, STAGE 3 (MODERATE): ICD-10-CM

## 2019-03-13 PROCEDURE — 99999 PR PBB SHADOW E&M-EST. PATIENT-LVL III: CPT | Mod: PBBFAC,,, | Performed by: INTERNAL MEDICINE

## 2019-03-13 PROCEDURE — 99213 OFFICE O/P EST LOW 20 MIN: CPT | Mod: PBBFAC | Performed by: INTERNAL MEDICINE

## 2019-03-13 PROCEDURE — 99999 PR PBB SHADOW E&M-EST. PATIENT-LVL III: ICD-10-PCS | Mod: PBBFAC,,, | Performed by: INTERNAL MEDICINE

## 2019-03-13 PROCEDURE — 99214 OFFICE O/P EST MOD 30 MIN: CPT | Mod: S$PBB,,, | Performed by: INTERNAL MEDICINE

## 2019-03-13 PROCEDURE — 99214 PR OFFICE/OUTPT VISIT, EST, LEVL IV, 30-39 MIN: ICD-10-PCS | Mod: S$PBB,,, | Performed by: INTERNAL MEDICINE

## 2019-03-14 ENCOUNTER — HOSPITAL ENCOUNTER (OUTPATIENT)
Dept: RADIOLOGY | Facility: HOSPITAL | Age: 84
Discharge: HOME | End: 2019-03-14
Attending: INTERNAL MEDICINE
Payer: MEDICARE

## 2019-03-14 DIAGNOSIS — N18.30 CHRONIC KIDNEY DISEASE, STAGE III (MODERATE): ICD-10-CM

## 2019-03-14 PROCEDURE — 93975 VASCULAR STUDY: CPT | Mod: 26,,, | Performed by: RADIOLOGY

## 2019-03-14 PROCEDURE — 93975 VASCULAR STUDY: CPT | Mod: TC

## 2019-03-14 PROCEDURE — 93975 US DOPPLER RENAL ARTERY AND VEIN (XPD): ICD-10-PCS | Mod: 26,,, | Performed by: RADIOLOGY

## 2019-03-18 NOTE — PROGRESS NOTES
Subjective:       Patient ID: Lexy Conteh is a 83 y.o. White female who presents for re-evaluation of progression of Acute Kidney Injury and Chronic Kidney Disease    HPI    Ms. Conteh is an 83 year old woman with medical history of diabetes, hypertension, breast cancer presenting for follow up of chronic kidney disease.  Patient recently admitted for decompensated heart failure, creatinine elevated after discharge, losartan and diuretics held until further evaluation.  Patient reports swelling increased since, along with shortness of breath with exertion.  Patient reports blood pressure well-controlled, denies any dizziness or light-headedness.  She reports blood sugars well-controlled as well.  She reports adequate fluid intake, denies any NSAID use.  She otherwise denies any fever, chest pain, abdominal pain, diarrhea, dysuria/hematuria.     Review of Systems   Constitutional: Negative for appetite change, fatigue and fever.   Respiratory: Positive for shortness of breath. Negative for chest tightness.    Cardiovascular: Positive for leg swelling. Negative for chest pain.   Gastrointestinal: Negative for abdominal pain, constipation, diarrhea, nausea and vomiting.   Genitourinary: Negative for difficulty urinating, dysuria, flank pain, frequency, hematuria and urgency.   Musculoskeletal: Negative for arthralgias, joint swelling and myalgias.   Skin: Negative for rash and wound.   Neurological: Negative for dizziness, weakness and light-headedness.   All other systems reviewed and are negative.      Objective:      Physical Exam   Constitutional: She appears well-developed and well-nourished.   Cardiovascular: Normal rate, regular rhythm and normal heart sounds. Exam reveals no gallop and no friction rub.   No murmur heard.  Pulmonary/Chest: Effort normal. No respiratory distress. She has no wheezes. She has rales (bibasilar).   Abdominal: Soft. Bowel sounds are normal. There is no tenderness.    Musculoskeletal: She exhibits edema (1+ bilateral lower extremity).   Neurological: She is alert.   Skin: Skin is warm and dry. No rash noted. No erythema.   Psychiatric: She has a normal mood and affect.   Vitals reviewed.      Assessment:       1. Chronic kidney disease, stage III (moderate)    2. Acute renal failure with specified lesion        Plan:     Ms. Conteh is an 83 year old woman with medical history of diabetes, hypertension, breast cancer presenting for follow up of chronic kidney disease.  Creatinine mainly 1.2-1.4mg/dL for the past few years, consistent with CKD stage IIIb (likely age-related renal nephron loss, along with diabetic nephropathy).  Patient with minimal proteinuria on ARB, will obtain urine studies to trend proteinuria.  Patient with acute kidney injury, ARB diuretics held, will repeat renal panel to trend creatinine, will repeat renal US with doppler to rule out obstructive/vascular disease (reviewed renal US 2015).  Will restart torsemide given edema, will phone review daily weights prior to further recommendations.  Stressed importance of blood pressure/glycemic control to prevent any further progression of kidney disease, patient voiced understanding.  Further recommendations pending results of above.    - Anemia: Hgb previously near goal, erythropoetin therapy per Oncology  - Bone/mineral metabolism: PTH/VitD at goal for stage CKD    Return to clinic in 3-4 months pending pending renal panel within a couple of weeks, then with renal/heme panel, iron/TIBC/ferritin, urinalysis/culture, urine protein/creatinine ratio, renal US prior to next visit

## 2019-03-20 ENCOUNTER — INFUSION (OUTPATIENT)
Dept: INFUSION THERAPY | Facility: HOSPITAL | Age: 84
End: 2019-03-20
Attending: INTERNAL MEDICINE
Payer: MEDICARE

## 2019-03-20 VITALS
WEIGHT: 165.38 LBS | TEMPERATURE: 99 F | BODY MASS INDEX: 25.14 KG/M2 | DIASTOLIC BLOOD PRESSURE: 54 MMHG | OXYGEN SATURATION: 97 % | HEART RATE: 107 BPM | SYSTOLIC BLOOD PRESSURE: 96 MMHG | RESPIRATION RATE: 18 BRPM

## 2019-03-20 DIAGNOSIS — D64.9 ANEMIA: Primary | ICD-10-CM

## 2019-03-20 DIAGNOSIS — N18.30 ANEMIA OF CHRONIC RENAL FAILURE, STAGE 3 (MODERATE): ICD-10-CM

## 2019-03-20 DIAGNOSIS — D63.1 ANEMIA OF CHRONIC RENAL FAILURE, STAGE 3 (MODERATE): ICD-10-CM

## 2019-03-20 LAB
ERYTHROCYTE [DISTWIDTH] IN BLOOD BY AUTOMATED COUNT: 20.4 %
HCT VFR BLD AUTO: 28.6 %
HGB BLD-MCNC: 8.7 G/DL
MCH RBC QN AUTO: 22.1 PG
MCHC RBC AUTO-ENTMCNC: 30.4 G/DL
MCV RBC AUTO: 73 FL
NEUTROPHILS # BLD AUTO: 4.1 K/UL
PLATELET # BLD AUTO: 192 K/UL
PMV BLD AUTO: 8.8 FL
RBC # BLD AUTO: 3.94 M/UL
WBC # BLD AUTO: 5.78 K/UL

## 2019-03-20 PROCEDURE — 96372 THER/PROPH/DIAG INJ SC/IM: CPT

## 2019-03-20 PROCEDURE — 36415 COLL VENOUS BLD VENIPUNCTURE: CPT

## 2019-03-20 PROCEDURE — 63600175 PHARM REV CODE 636 W HCPCS: Mod: JG,EC | Performed by: INTERNAL MEDICINE

## 2019-03-20 PROCEDURE — 85027 COMPLETE CBC AUTOMATED: CPT

## 2019-03-20 RX ADMIN — ERYTHROPOIETIN 40000 UNITS: 40000 INJECTION, SOLUTION INTRAVENOUS; SUBCUTANEOUS at 10:03

## 2019-03-20 NOTE — PLAN OF CARE
Problem: Adult Inpatient Plan of Care  Goal: Plan of Care Review  Outcome: Ongoing (interventions implemented as appropriate)  Arrived to unit. Blood collected for cbc. Hg in range to give Procrit. Pt tolerated Procrit. Pt in wheelchair, discharged with son.

## 2019-03-22 ENCOUNTER — TELEPHONE (OUTPATIENT)
Dept: NEPHROLOGY | Facility: CLINIC | Age: 84
End: 2019-03-22

## 2019-03-22 NOTE — TELEPHONE ENCOUNTER
----- Message from Mallory Calvert sent at 3/22/2019 11:28 AM CDT -----  Contact: pt  Pt calling   As instructed   -     Took a fluid pill  Three days in a row     Out come     -    Just dripping   No flow -      NOT  ON FLUID PILLS   AND   Now going  3 or 4 times a night   Sometimes with a decent flow     Feet going down at nite  -  When she gets up starts to swell   -   Gets in recliner during day but the swelling      Still swollen from Stomach to her feet -    Still having shortness of breath when movoes around   Please call pt   [phone]       Thanks     Pt received call from Dr. Bartlett on today @4:24pm 3/22/2019

## 2019-03-25 ENCOUNTER — TELEPHONE (OUTPATIENT)
Dept: NEPHROLOGY | Facility: CLINIC | Age: 84
End: 2019-03-25

## 2019-03-25 DIAGNOSIS — N18.4 CHRONIC KIDNEY DISEASE, STAGE IV (SEVERE): Primary | ICD-10-CM

## 2019-03-25 RX ORDER — METOLAZONE 5 MG/1
5 TABLET ORAL DAILY PRN
Qty: 30 TABLET | Refills: 0 | Status: SHIPPED | OUTPATIENT
Start: 2019-03-25

## 2019-04-03 ENCOUNTER — TELEPHONE (OUTPATIENT)
Dept: FAMILY MEDICINE | Facility: CLINIC | Age: 84
End: 2019-04-03

## 2019-04-03 ENCOUNTER — INFUSION (OUTPATIENT)
Dept: INFUSION THERAPY | Facility: HOSPITAL | Age: 84
End: 2019-04-03
Attending: INTERNAL MEDICINE
Payer: MEDICARE

## 2019-04-03 ENCOUNTER — TELEPHONE (OUTPATIENT)
Dept: HEMATOLOGY/ONCOLOGY | Facility: CLINIC | Age: 84
End: 2019-04-03

## 2019-04-03 VITALS
SYSTOLIC BLOOD PRESSURE: 91 MMHG | RESPIRATION RATE: 18 BRPM | OXYGEN SATURATION: 98 % | HEART RATE: 77 BPM | DIASTOLIC BLOOD PRESSURE: 48 MMHG | TEMPERATURE: 98 F

## 2019-04-03 DIAGNOSIS — D63.1 ANEMIA OF CHRONIC RENAL FAILURE, STAGE 3 (MODERATE): ICD-10-CM

## 2019-04-03 DIAGNOSIS — N18.30 ANEMIA OF CHRONIC RENAL FAILURE, STAGE 3 (MODERATE): ICD-10-CM

## 2019-04-03 DIAGNOSIS — N18.9 CKD (CHRONIC KIDNEY DISEASE): Primary | ICD-10-CM

## 2019-04-03 DIAGNOSIS — D64.9 ANEMIA: ICD-10-CM

## 2019-04-03 LAB
ALBUMIN SERPL BCP-MCNC: 3.3 G/DL (ref 3.5–5.2)
ANION GAP SERPL CALC-SCNC: 8 MMOL/L (ref 8–16)
ANISOCYTOSIS BLD QL SMEAR: SLIGHT
BASOPHILS # BLD AUTO: 0.04 K/UL (ref 0–0.2)
BASOPHILS NFR BLD: 0.6 % (ref 0–1.9)
BUN SERPL-MCNC: 34 MG/DL (ref 8–23)
CALCIUM SERPL-MCNC: 10.1 MG/DL (ref 8.7–10.5)
CHLORIDE SERPL-SCNC: 88 MMOL/L (ref 95–110)
CO2 SERPL-SCNC: 37 MMOL/L (ref 23–29)
CREAT SERPL-MCNC: 2.1 MG/DL (ref 0.5–1.4)
DACRYOCYTES BLD QL SMEAR: ABNORMAL
DIFFERENTIAL METHOD: ABNORMAL
EOSINOPHIL # BLD AUTO: 0.1 K/UL (ref 0–0.5)
EOSINOPHIL NFR BLD: 0.8 % (ref 0–8)
ERYTHROCYTE [DISTWIDTH] IN BLOOD BY AUTOMATED COUNT: 21.5 % (ref 11.5–14.5)
EST. GFR  (AFRICAN AMERICAN): 25 ML/MIN/1.73 M^2
EST. GFR  (NON AFRICAN AMERICAN): 21 ML/MIN/1.73 M^2
FERRITIN SERPL-MCNC: 34 NG/ML (ref 20–300)
GIANT PLATELETS BLD QL SMEAR: PRESENT
GLUCOSE SERPL-MCNC: 155 MG/DL (ref 70–110)
HCT VFR BLD AUTO: 26.4 % (ref 37–48.5)
HGB BLD-MCNC: 8 G/DL (ref 12–16)
HYPOCHROMIA BLD QL SMEAR: ABNORMAL
IRON SERPL-MCNC: 17 UG/DL (ref 30–160)
LYMPHOCYTES # BLD AUTO: 0.8 K/UL (ref 1–4.8)
LYMPHOCYTES NFR BLD: 12.9 % (ref 18–48)
MCH RBC QN AUTO: 21.7 PG (ref 27–31)
MCHC RBC AUTO-ENTMCNC: 30.3 G/DL (ref 32–36)
MCV RBC AUTO: 72 FL (ref 82–98)
MONOCYTES # BLD AUTO: 0.7 K/UL (ref 0.3–1)
MONOCYTES NFR BLD: 10.2 % (ref 4–15)
NEUTROPHILS # BLD AUTO: 4.9 K/UL (ref 1.8–7.7)
NEUTROPHILS NFR BLD: 75.5 % (ref 38–73)
OVALOCYTES BLD QL SMEAR: ABNORMAL
PHOSPHATE SERPL-MCNC: 3.6 MG/DL (ref 2.7–4.5)
PLATELET # BLD AUTO: 291 K/UL (ref 150–350)
PLATELET BLD QL SMEAR: ABNORMAL
PMV BLD AUTO: 8.4 FL (ref 9.2–12.9)
POIKILOCYTOSIS BLD QL SMEAR: SLIGHT
POTASSIUM SERPL-SCNC: 2.2 MMOL/L (ref 3.5–5.1)
RBC # BLD AUTO: 3.69 M/UL (ref 4–5.4)
SATURATED IRON: 5 % (ref 20–50)
SCHISTOCYTES BLD QL SMEAR: ABNORMAL
SODIUM SERPL-SCNC: 133 MMOL/L (ref 136–145)
TARGETS BLD QL SMEAR: ABNORMAL
TOTAL IRON BINDING CAPACITY: 348 UG/DL (ref 250–450)
TRANSFERRIN SERPL-MCNC: 235 MG/DL (ref 200–375)
WBC # BLD AUTO: 6.45 K/UL (ref 3.9–12.7)

## 2019-04-03 PROCEDURE — 36415 COLL VENOUS BLD VENIPUNCTURE: CPT

## 2019-04-03 PROCEDURE — 63600175 PHARM REV CODE 636 W HCPCS: Mod: JG,EC | Performed by: INTERNAL MEDICINE

## 2019-04-03 PROCEDURE — 83540 ASSAY OF IRON: CPT

## 2019-04-03 PROCEDURE — 82728 ASSAY OF FERRITIN: CPT

## 2019-04-03 PROCEDURE — 96372 THER/PROPH/DIAG INJ SC/IM: CPT

## 2019-04-03 PROCEDURE — 85025 COMPLETE CBC W/AUTO DIFF WBC: CPT

## 2019-04-03 PROCEDURE — 80069 RENAL FUNCTION PANEL: CPT

## 2019-04-03 RX ADMIN — ERYTHROPOIETIN 40000 UNITS: 40000 INJECTION, SOLUTION INTRAVENOUS; SUBCUTANEOUS at 10:04

## 2019-04-03 NOTE — TELEPHONE ENCOUNTER
----- Message from Sebastiánprosperfrandy Kunznormyossi sent at 4/3/2019 12:19 PM CDT -----  Contact: Pat/.S. [facility] [phone]  Type: Patient Call Back    Who called: Pat    What is the request in detail: Pat stated that the patient's paperwork (for Diabetic Supplies) requires a date (by the Diagnosis Code). Thank you.    Can the clinic reply by [facility]? No    Would the patient rather a call back or a response via [facility]? Call back    Best call back number: [phone]

## 2019-04-04 ENCOUNTER — TELEPHONE (OUTPATIENT)
Dept: NEPHROLOGY | Facility: CLINIC | Age: 84
End: 2019-04-04

## 2019-04-04 RX ORDER — POTASSIUM CHLORIDE 20 MEQ/1
20 TABLET, EXTENDED RELEASE ORAL 2 TIMES DAILY
Qty: 60 TABLET | Refills: 2 | Status: SHIPPED | OUTPATIENT
Start: 2019-04-04

## 2019-04-04 RX ORDER — FUROSEMIDE 40 MG/1
80 TABLET ORAL 2 TIMES DAILY PRN
Qty: 120 TABLET | Refills: 11 | Status: SHIPPED | OUTPATIENT
Start: 2019-04-04

## 2019-04-08 RX ORDER — GABAPENTIN 100 MG/1
CAPSULE ORAL
Qty: 180 CAPSULE | Refills: 12 | Status: SHIPPED | OUTPATIENT
Start: 2019-04-08

## 2019-04-08 RX ORDER — TRAMADOL HYDROCHLORIDE 50 MG/1
TABLET ORAL
Qty: 180 TABLET | Refills: 5 | Status: SHIPPED | OUTPATIENT
Start: 2019-04-08

## 2019-04-08 RX ORDER — SIMVASTATIN 20 MG/1
20 TABLET, FILM COATED ORAL NIGHTLY
Qty: 90 TABLET | Refills: 12 | Status: SHIPPED | OUTPATIENT
Start: 2019-04-08

## 2019-04-08 NOTE — TELEPHONE ENCOUNTER
----- Message from Aliya Scott sent at 4/8/2019 10:13 AM CDT -----  Contact: Self/  [phone]  Refill:  traMADol (ULTRAM) 50 mg tablet  ranitidine (ZANTAC) 150 MG capsule  simvastatin (ZOCOR) 20 MG tablet  gabapentin (NEURONTIN) 100 MG capsule  [facility] NELI RAMIREZ [facility] - [place], [place] - [address]  Thank you

## 2019-04-29 ENCOUNTER — TELEPHONE (OUTPATIENT)
Dept: ADMINISTRATIVE | Facility: CLINIC | Age: 84
End: 2019-04-29

## 2019-04-29 NOTE — TELEPHONE ENCOUNTER
Home Health SOC 02/25/2019 - 04/25/2019 with [facility] ([place]) - Dr. Ronny Mathis. SN services.

## 2019-05-30 ENCOUNTER — TELEPHONE (OUTPATIENT)
Dept: FAMILY MEDICINE | Facility: CLINIC | Age: 84
End: 2019-05-30

## 2019-05-30 NOTE — TELEPHONE ENCOUNTER
----- Message from Nilsa Hubbard sent at 5/30/2019 12:54 PM CDT -----  Contact: [facility]  Type: Patient Call Back    Who called: [facility]    What is the request in detail:[facility] is calling to report to  Dr. Mathis that the patient has passed.    Can the clinic reply by [facility]? No    Would the patient rather a call back or a response via [facility]? Call back    Best call back number: [phone]    Additional Information: